# Patient Record
Sex: MALE | Race: WHITE | NOT HISPANIC OR LATINO | Employment: UNEMPLOYED | URBAN - NONMETROPOLITAN AREA
[De-identification: names, ages, dates, MRNs, and addresses within clinical notes are randomized per-mention and may not be internally consistent; named-entity substitution may affect disease eponyms.]

---

## 2017-11-21 ENCOUNTER — HOSPITAL ENCOUNTER (EMERGENCY)
Facility: HOSPITAL | Age: 42
Discharge: HOME | End: 2017-11-22
Attending: EMERGENCY MEDICINE | Admitting: EMERGENCY MEDICINE

## 2017-11-21 ENCOUNTER — APPOINTMENT (OUTPATIENT)
Dept: CT IMAGING | Facility: HOSPITAL | Age: 42
End: 2017-11-21

## 2017-11-21 DIAGNOSIS — R56.9 SEIZURE-LIKE ACTIVITY (HCC): Primary | ICD-10-CM

## 2017-11-21 LAB
ALBUMIN SERPL-MCNC: 4.7 G/DL (ref 3.5–5)
ALBUMIN/GLOB SERPL: 1.4 G/DL (ref 1.1–2.5)
ALP SERPL-CCNC: 81 U/L (ref 24–120)
ALT SERPL W P-5'-P-CCNC: 48 U/L (ref 0–54)
ANION GAP SERPL CALCULATED.3IONS-SCNC: 12 MMOL/L (ref 4–13)
APAP SERPL-MCNC: <10 MCG/ML (ref 10–30)
AST SERPL-CCNC: 88 U/L (ref 7–45)
BASOPHILS # BLD AUTO: 0.05 10*3/MM3 (ref 0–0.2)
BASOPHILS NFR BLD AUTO: 0.5 % (ref 0–2)
BILIRUB SERPL-MCNC: 1.2 MG/DL (ref 0.1–1)
BUN BLD-MCNC: 13 MG/DL (ref 5–21)
BUN/CREAT SERPL: 19.7 (ref 7–25)
CALCIUM SPEC-SCNC: 9.4 MG/DL (ref 8.4–10.4)
CHLORIDE SERPL-SCNC: 93 MMOL/L (ref 98–110)
CO2 SERPL-SCNC: 28 MMOL/L (ref 24–31)
CREAT BLD-MCNC: 0.66 MG/DL (ref 0.5–1.4)
D-LACTATE SERPL-SCNC: 1.4 MMOL/L (ref 0.5–2)
DEPRECATED RDW RBC AUTO: 50.6 FL (ref 40–54)
EOSINOPHIL # BLD AUTO: 0.02 10*3/MM3 (ref 0–0.7)
EOSINOPHIL NFR BLD AUTO: 0.2 % (ref 0–4)
ERYTHROCYTE [DISTWIDTH] IN BLOOD BY AUTOMATED COUNT: 14.4 % (ref 12–15)
ETHANOL UR QL: <0.01 %
GFR SERPL CREATININE-BSD FRML MDRD: 131 ML/MIN/1.73
GLOBULIN UR ELPH-MCNC: 3.4 GM/DL
GLUCOSE BLD-MCNC: 86 MG/DL (ref 70–100)
HCT VFR BLD AUTO: 35.2 % (ref 40–52)
HGB BLD-MCNC: 11.8 G/DL (ref 14–18)
IMM GRANULOCYTES # BLD: 0.02 10*3/MM3 (ref 0–0.03)
IMM GRANULOCYTES NFR BLD: 0.2 % (ref 0–5)
INR PPP: 0.95 (ref 0.91–1.09)
LIPASE SERPL-CCNC: 64 U/L (ref 23–203)
LYMPHOCYTES # BLD AUTO: 0.51 10*3/MM3 (ref 0.72–4.86)
LYMPHOCYTES NFR BLD AUTO: 4.8 % (ref 15–45)
MCH RBC QN AUTO: 32.1 PG (ref 28–32)
MCHC RBC AUTO-ENTMCNC: 33.5 G/DL (ref 33–36)
MCV RBC AUTO: 95.7 FL (ref 82–95)
MONOCYTES # BLD AUTO: 1.3 10*3/MM3 (ref 0.19–1.3)
MONOCYTES NFR BLD AUTO: 12.3 % (ref 4–12)
NEUTROPHILS # BLD AUTO: 8.65 10*3/MM3 (ref 1.87–8.4)
NEUTROPHILS NFR BLD AUTO: 82 % (ref 39–78)
PLATELET # BLD AUTO: 183 10*3/MM3 (ref 130–400)
PMV BLD AUTO: 10 FL (ref 6–12)
POTASSIUM BLD-SCNC: 3.9 MMOL/L (ref 3.5–5.3)
PROT SERPL-MCNC: 8.1 G/DL (ref 6.3–8.7)
PROTHROMBIN TIME: 13 SECONDS (ref 11.9–14.6)
RBC # BLD AUTO: 3.68 10*6/MM3 (ref 4.8–5.9)
SALICYLATES SERPL-MCNC: <1 MG/DL (ref 15–30)
SODIUM BLD-SCNC: 133 MMOL/L (ref 135–145)
WBC NRBC COR # BLD: 10.55 10*3/MM3 (ref 4.8–10.8)

## 2017-11-21 PROCEDURE — 85025 COMPLETE CBC W/AUTO DIFF WBC: CPT | Performed by: EMERGENCY MEDICINE

## 2017-11-21 PROCEDURE — 80307 DRUG TEST PRSMV CHEM ANLYZR: CPT | Performed by: EMERGENCY MEDICINE

## 2017-11-21 PROCEDURE — 25010000002 ONDANSETRON PER 1 MG: Performed by: EMERGENCY MEDICINE

## 2017-11-21 PROCEDURE — 85610 PROTHROMBIN TIME: CPT | Performed by: EMERGENCY MEDICINE

## 2017-11-21 PROCEDURE — 70450 CT HEAD/BRAIN W/O DYE: CPT

## 2017-11-21 PROCEDURE — 36415 COLL VENOUS BLD VENIPUNCTURE: CPT

## 2017-11-21 PROCEDURE — 99285 EMERGENCY DEPT VISIT HI MDM: CPT

## 2017-11-21 PROCEDURE — 80053 COMPREHEN METABOLIC PANEL: CPT | Performed by: EMERGENCY MEDICINE

## 2017-11-21 PROCEDURE — 83690 ASSAY OF LIPASE: CPT | Performed by: EMERGENCY MEDICINE

## 2017-11-21 PROCEDURE — 83605 ASSAY OF LACTIC ACID: CPT | Performed by: EMERGENCY MEDICINE

## 2017-11-21 PROCEDURE — 96374 THER/PROPH/DIAG INJ IV PUSH: CPT

## 2017-11-21 RX ORDER — ONDANSETRON 2 MG/ML
4 INJECTION INTRAMUSCULAR; INTRAVENOUS ONCE
Status: COMPLETED | OUTPATIENT
Start: 2017-11-21 | End: 2017-11-21

## 2017-11-21 RX ADMIN — ONDANSETRON 4 MG: 2 INJECTION, SOLUTION INTRAMUSCULAR; INTRAVENOUS at 23:20

## 2017-11-22 VITALS
SYSTOLIC BLOOD PRESSURE: 147 MMHG | WEIGHT: 140 LBS | TEMPERATURE: 98.1 F | HEIGHT: 63 IN | RESPIRATION RATE: 16 BRPM | DIASTOLIC BLOOD PRESSURE: 81 MMHG | BODY MASS INDEX: 24.8 KG/M2 | HEART RATE: 114 BPM | OXYGEN SATURATION: 98 %

## 2017-11-22 LAB
AMPHET+METHAMPHET UR QL: POSITIVE
BARBITURATES UR QL SCN: NEGATIVE
BENZODIAZ UR QL SCN: NEGATIVE
CANNABINOIDS SERPL QL: NEGATIVE
COCAINE UR QL: NEGATIVE
METHADONE UR QL SCN: NEGATIVE
OPIATES UR QL: NEGATIVE
PCP UR QL SCN: NEGATIVE

## 2017-11-22 PROCEDURE — 80307 DRUG TEST PRSMV CHEM ANLYZR: CPT | Performed by: EMERGENCY MEDICINE

## 2017-11-22 NOTE — ED PROVIDER NOTES
"Subjective   HPI Comments: Patient is a 42-year-old male who presents with seizure-like activity.  Per EMS, patient's father called secondary to seizure activity.  Patient does have seizure disorder and has not taken medication for at least 2 months.  Patient is a chronic alcoholic and drinks 1 L of vodka daily but has not drank for 2 days.  He does have a history of DTs.  Patient is unsure what medication he is on for seizures.  He reports vomiting today and restlessness.      History provided by:  EMS personnel and patient      Review of Systems   Constitutional: Negative for fever.   HENT: Negative for sore throat.    Eyes: Negative for visual disturbance.   Respiratory: Negative for shortness of breath.    Cardiovascular: Negative for chest pain.   Gastrointestinal: Positive for vomiting. Negative for abdominal distention, abdominal pain, constipation, diarrhea and nausea.   Genitourinary: Negative for difficulty urinating, dysuria, flank pain, frequency and hematuria.   Musculoskeletal: Negative for back pain.   Skin: Negative for rash.   Neurological: Positive for seizures. Negative for dizziness, tremors, syncope, facial asymmetry, speech difficulty, weakness, light-headedness, numbness and headaches.       Past Medical History:   Diagnosis Date   • Alcohol abuse    • Seizures        Allergies   Allergen Reactions   • Codeine GI Intolerance       History reviewed. No pertinent surgical history.    History reviewed. No pertinent family history.    Social History     Social History   • Marital status: Single     Spouse name: N/A   • Number of children: N/A   • Years of education: N/A     Social History Main Topics   • Smoking status: Never Smoker   • Smokeless tobacco: Current User     Types: Snuff   • Alcohol use Yes      Comment: \"LITER OF VODKA\" DAILY. PT STATES HE DRANK ONE SHOT OF WHISKEY TODAY.   • Drug use: No   • Sexual activity: Defer     Other Topics Concern   • None     Social History Narrative   • " None           Objective   Physical Exam   Constitutional: He is oriented to person, place, and time. He appears well-developed and well-nourished. He is cooperative.  Non-toxic appearance. No distress.   HENT:   Head: Atraumatic.   Mouth/Throat: Oropharynx is clear and moist and mucous membranes are normal.   Eyes: EOM are normal. Pupils are equal, round, and reactive to light.   Neck: Normal range of motion. Neck supple.   Cardiovascular: Normal rate, regular rhythm, normal heart sounds and intact distal pulses.  Exam reveals no gallop and no friction rub.    No murmur heard.  Pulmonary/Chest: Effort normal and breath sounds normal. No respiratory distress. He has no wheezes. He has no rhonchi. He has no rales. He exhibits no tenderness.   Abdominal: Soft. He exhibits no distension. There is no tenderness. There is no rebound and no guarding.   Musculoskeletal: Normal range of motion. He exhibits no edema.   Neurological: He is alert and oriented to person, place, and time. He displays normal reflexes. No cranial nerve deficit. He exhibits normal muscle tone. Coordination normal.   Skin: Skin is warm and dry.   Psychiatric: He has a normal mood and affect.   Nursing note and vitals reviewed.      Procedures         ED Course  ED Course   Comment By Time   This is a 42-year-old male who presents in the setting of seizure-like activity.  Upon arrival, patient had normal neurologic exam.  No signs of autonomic hyperactivity.  He was well-appearing.  No SI, HI, or hallucinations.  No signs of DTs.  Drug screen positive for amphetamines.  Labs otherwise benign with normal EtOH.  Lactate normal.  Head CT negative.  At this time, no indication for acute hospitalization.  We will discharge patient home.  Advised him to follow up with his PCP regarding his antiepileptic medication. Mando Cano MD 11/22 1061                  MDM  Number of Diagnoses or Management Options  Seizure-like activity:      Amount and/or  Complexity of Data Reviewed  Clinical lab tests: reviewed  Tests in the radiology section of CPT®: ordered and reviewed    Risk of Complications, Morbidity, and/or Mortality  Presenting problems: moderate  Diagnostic procedures: moderate  Management options: low    Patient Progress  Patient progress: stable      Final diagnoses:   Seizure-like activity            Mando Cano MD  11/22/17 0229

## 2017-11-22 NOTE — ED NOTES
PT STATES HE HAS HAD A SHOT OF WHISKEY TODAY. PT STATES HE HAS ALSO TAKEN LORTAB 7.5 MG X 1 AND SEROQUIL X 1 TODAY, BOTH MEDS NOT PRESCRIBED TO HIM.      Kassidy Ortiz RN  11/21/17 0566

## 2018-02-25 ENCOUNTER — APPOINTMENT (OUTPATIENT)
Dept: CT IMAGING | Facility: HOSPITAL | Age: 43
End: 2018-02-25

## 2018-02-25 ENCOUNTER — APPOINTMENT (OUTPATIENT)
Dept: GENERAL RADIOLOGY | Facility: HOSPITAL | Age: 43
End: 2018-02-25

## 2018-02-25 ENCOUNTER — HOSPITAL ENCOUNTER (EMERGENCY)
Facility: HOSPITAL | Age: 43
Discharge: HOME | End: 2018-02-26
Attending: EMERGENCY MEDICINE | Admitting: EMERGENCY MEDICINE

## 2018-02-25 DIAGNOSIS — M19.90 ARTHRITIS: ICD-10-CM

## 2018-02-25 DIAGNOSIS — F10.10 ALCOHOL ABUSE: ICD-10-CM

## 2018-02-25 DIAGNOSIS — F10.929 ALCOHOLIC INTOXICATION WITH COMPLICATION (HCC): ICD-10-CM

## 2018-02-25 DIAGNOSIS — R07.9 CHEST PAIN, UNSPECIFIED TYPE: Primary | ICD-10-CM

## 2018-02-25 LAB
ALBUMIN SERPL-MCNC: 4.4 G/DL (ref 3.5–5)
ALBUMIN/GLOB SERPL: 1.3 G/DL (ref 1.1–2.5)
ALP SERPL-CCNC: 102 U/L (ref 24–120)
ALT SERPL W P-5'-P-CCNC: 30 U/L (ref 0–54)
AMPHET+METHAMPHET UR QL: NEGATIVE
ANION GAP SERPL CALCULATED.3IONS-SCNC: 12 MMOL/L (ref 4–13)
APTT PPP: 26.7 SECONDS (ref 24.1–34.8)
AST SERPL-CCNC: 50 U/L (ref 7–45)
BACTERIA UR QL AUTO: ABNORMAL /HPF
BARBITURATES UR QL SCN: NEGATIVE
BASOPHILS # BLD AUTO: 0.05 10*3/MM3 (ref 0–0.2)
BASOPHILS NFR BLD AUTO: 0.9 % (ref 0–2)
BENZODIAZ UR QL SCN: POSITIVE
BILIRUB SERPL-MCNC: 0.2 MG/DL (ref 0.1–1)
BILIRUB UR QL STRIP: NEGATIVE
BUN BLD-MCNC: 7 MG/DL (ref 5–21)
BUN/CREAT SERPL: 10.1 (ref 7–25)
CALCIUM SPEC-SCNC: 8.7 MG/DL (ref 8.4–10.4)
CANNABINOIDS SERPL QL: NEGATIVE
CHLORIDE SERPL-SCNC: 96 MMOL/L (ref 98–110)
CK SERPL-CCNC: 264 U/L (ref 0–203)
CLARITY UR: ABNORMAL
CO2 SERPL-SCNC: 38 MMOL/L (ref 24–31)
COCAINE UR QL: NEGATIVE
COD CRY URNS QL: ABNORMAL /HPF
COLOR UR: YELLOW
CREAT BLD-MCNC: 0.69 MG/DL (ref 0.5–1.4)
D DIMER PPP FEU-MCNC: 2.04 MG/L (FEU) (ref 0–0.5)
DEPRECATED RDW RBC AUTO: 43.2 FL (ref 40–54)
EOSINOPHIL # BLD AUTO: 0.23 10*3/MM3 (ref 0–0.7)
EOSINOPHIL NFR BLD AUTO: 4.1 % (ref 0–4)
ERYTHROCYTE [DISTWIDTH] IN BLOOD BY AUTOMATED COUNT: 13.4 % (ref 12–15)
ETHANOL UR QL: 0.42 %
GFR SERPL CREATININE-BSD FRML MDRD: 125 ML/MIN/1.73
GLOBULIN UR ELPH-MCNC: 3.4 GM/DL
GLUCOSE BLD-MCNC: 82 MG/DL (ref 70–100)
GLUCOSE UR STRIP-MCNC: ABNORMAL MG/DL
HCT VFR BLD AUTO: 37.7 % (ref 40–52)
HGB BLD-MCNC: 12.5 G/DL (ref 14–18)
HGB UR QL STRIP.AUTO: NEGATIVE
HOLD SPECIMEN: NORMAL
HOLD SPECIMEN: NORMAL
HYALINE CASTS UR QL AUTO: ABNORMAL /LPF
IMM GRANULOCYTES # BLD: 0.01 10*3/MM3 (ref 0–0.03)
IMM GRANULOCYTES NFR BLD: 0.2 % (ref 0–5)
INR PPP: 0.92 (ref 0.91–1.09)
KETONES UR QL STRIP: ABNORMAL
LEUKOCYTE ESTERASE UR QL STRIP.AUTO: NEGATIVE
LIPASE SERPL-CCNC: 95 U/L (ref 23–203)
LYMPHOCYTES # BLD AUTO: 2.58 10*3/MM3 (ref 0.72–4.86)
LYMPHOCYTES NFR BLD AUTO: 46.4 % (ref 15–45)
MCH RBC QN AUTO: 29.3 PG (ref 28–32)
MCHC RBC AUTO-ENTMCNC: 33.2 G/DL (ref 33–36)
MCV RBC AUTO: 88.3 FL (ref 82–95)
METHADONE UR QL SCN: NEGATIVE
MONOCYTES # BLD AUTO: 0.77 10*3/MM3 (ref 0.19–1.3)
MONOCYTES NFR BLD AUTO: 13.8 % (ref 4–12)
NEUTROPHILS # BLD AUTO: 1.92 10*3/MM3 (ref 1.87–8.4)
NEUTROPHILS NFR BLD AUTO: 34.6 % (ref 39–78)
NITRITE UR QL STRIP: NEGATIVE
NRBC BLD MANUAL-RTO: 0 /100 WBC (ref 0–0)
OPIATES UR QL: NEGATIVE
PCP UR QL SCN: NEGATIVE
PH UR STRIP.AUTO: 7.5 [PH] (ref 5–8)
PLATELET # BLD AUTO: 248 10*3/MM3 (ref 130–400)
PMV BLD AUTO: 9.8 FL (ref 6–12)
POTASSIUM BLD-SCNC: 4.3 MMOL/L (ref 3.5–5.3)
PROT SERPL-MCNC: 7.8 G/DL (ref 6.3–8.7)
PROT UR QL STRIP: ABNORMAL
PROTHROMBIN TIME: 12.6 SECONDS (ref 11.9–14.6)
RBC # BLD AUTO: 4.27 10*6/MM3 (ref 4.8–5.9)
RBC # UR: ABNORMAL /HPF
REF LAB TEST METHOD: ABNORMAL
SODIUM BLD-SCNC: 146 MMOL/L (ref 135–145)
SP GR UR STRIP: 1.03 (ref 1–1.03)
SQUAMOUS #/AREA URNS HPF: ABNORMAL /HPF
TROPONIN I SERPL-MCNC: <0.012 NG/ML (ref 0–0.03)
UROBILINOGEN UR QL STRIP: ABNORMAL
WBC NRBC COR # BLD: 5.56 10*3/MM3 (ref 4.8–10.8)
WBC UR QL AUTO: ABNORMAL /HPF
WHOLE BLOOD HOLD SPECIMEN: NORMAL
WHOLE BLOOD HOLD SPECIMEN: NORMAL

## 2018-02-25 PROCEDURE — 80307 DRUG TEST PRSMV CHEM ANLYZR: CPT | Performed by: EMERGENCY MEDICINE

## 2018-02-25 PROCEDURE — 83690 ASSAY OF LIPASE: CPT | Performed by: EMERGENCY MEDICINE

## 2018-02-25 PROCEDURE — 0 IOPAMIDOL PER 1 ML: Performed by: EMERGENCY MEDICINE

## 2018-02-25 PROCEDURE — 96361 HYDRATE IV INFUSION ADD-ON: CPT

## 2018-02-25 PROCEDURE — 93005 ELECTROCARDIOGRAM TRACING: CPT | Performed by: EMERGENCY MEDICINE

## 2018-02-25 PROCEDURE — 73030 X-RAY EXAM OF SHOULDER: CPT

## 2018-02-25 PROCEDURE — 82550 ASSAY OF CK (CPK): CPT | Performed by: EMERGENCY MEDICINE

## 2018-02-25 PROCEDURE — 85379 FIBRIN DEGRADATION QUANT: CPT | Performed by: EMERGENCY MEDICINE

## 2018-02-25 PROCEDURE — 99285 EMERGENCY DEPT VISIT HI MDM: CPT

## 2018-02-25 PROCEDURE — 80053 COMPREHEN METABOLIC PANEL: CPT | Performed by: EMERGENCY MEDICINE

## 2018-02-25 PROCEDURE — 84484 ASSAY OF TROPONIN QUANT: CPT | Performed by: EMERGENCY MEDICINE

## 2018-02-25 PROCEDURE — 85610 PROTHROMBIN TIME: CPT | Performed by: EMERGENCY MEDICINE

## 2018-02-25 PROCEDURE — 85025 COMPLETE CBC W/AUTO DIFF WBC: CPT | Performed by: EMERGENCY MEDICINE

## 2018-02-25 PROCEDURE — 93010 ELECTROCARDIOGRAM REPORT: CPT | Performed by: INTERNAL MEDICINE

## 2018-02-25 PROCEDURE — 71045 X-RAY EXAM CHEST 1 VIEW: CPT

## 2018-02-25 PROCEDURE — 81001 URINALYSIS AUTO W/SCOPE: CPT | Performed by: EMERGENCY MEDICINE

## 2018-02-25 PROCEDURE — 85730 THROMBOPLASTIN TIME PARTIAL: CPT | Performed by: EMERGENCY MEDICINE

## 2018-02-25 PROCEDURE — 71275 CT ANGIOGRAPHY CHEST: CPT

## 2018-02-25 PROCEDURE — 96374 THER/PROPH/DIAG INJ IV PUSH: CPT

## 2018-02-25 RX ORDER — RANITIDINE HCL 75 MG
75 TABLET ORAL 2 TIMES DAILY
COMMUNITY
End: 2018-02-26

## 2018-02-25 RX ORDER — SODIUM CHLORIDE 9 MG/ML
1000 INJECTION, SOLUTION INTRAVENOUS ONCE
Status: COMPLETED | OUTPATIENT
Start: 2018-02-25 | End: 2018-02-25

## 2018-02-25 RX ORDER — SUCRALFATE ORAL 1 G/10ML
1 SUSPENSION ORAL ONCE
Status: COMPLETED | OUTPATIENT
Start: 2018-02-25 | End: 2018-02-25

## 2018-02-25 RX ORDER — DIAZEPAM 10 MG/1
10 TABLET ORAL ONCE
Status: COMPLETED | OUTPATIENT
Start: 2018-02-25 | End: 2018-02-25

## 2018-02-25 RX ORDER — LISINOPRIL 10 MG/1
10 TABLET ORAL DAILY
COMMUNITY
End: 2018-02-26

## 2018-02-25 RX ORDER — SUCRALFATE 1 G/1
1 TABLET ORAL 4 TIMES DAILY
COMMUNITY
End: 2018-02-26

## 2018-02-25 RX ORDER — FAMOTIDINE 10 MG/ML
20 INJECTION, SOLUTION INTRAVENOUS ONCE
Status: COMPLETED | OUTPATIENT
Start: 2018-02-25 | End: 2018-02-25

## 2018-02-25 RX ADMIN — DIAZEPAM 10 MG: 10 TABLET ORAL at 20:56

## 2018-02-25 RX ADMIN — SODIUM CHLORIDE 1000 ML: 9 INJECTION, SOLUTION INTRAVENOUS at 20:54

## 2018-02-25 RX ADMIN — FAMOTIDINE 20 MG: 10 INJECTION INTRAVENOUS at 20:55

## 2018-02-25 RX ADMIN — IOPAMIDOL 105 ML: 755 INJECTION, SOLUTION INTRAVENOUS at 23:25

## 2018-02-25 RX ADMIN — SUCRALFATE 1 G: 1 SUSPENSION ORAL at 23:01

## 2018-02-26 VITALS
TEMPERATURE: 98.2 F | WEIGHT: 125 LBS | SYSTOLIC BLOOD PRESSURE: 125 MMHG | BODY MASS INDEX: 22.15 KG/M2 | HEART RATE: 78 BPM | RESPIRATION RATE: 18 BRPM | OXYGEN SATURATION: 94 % | HEIGHT: 63 IN | DIASTOLIC BLOOD PRESSURE: 90 MMHG

## 2018-02-26 LAB — TROPONIN I SERPL-MCNC: <0.012 NG/ML (ref 0–0.03)

## 2018-02-26 PROCEDURE — 84484 ASSAY OF TROPONIN QUANT: CPT | Performed by: EMERGENCY MEDICINE

## 2018-02-26 PROCEDURE — 93005 ELECTROCARDIOGRAM TRACING: CPT | Performed by: EMERGENCY MEDICINE

## 2018-02-26 PROCEDURE — 93010 ELECTROCARDIOGRAM REPORT: CPT | Performed by: INTERNAL MEDICINE

## 2018-02-26 RX ORDER — LISINOPRIL 10 MG/1
10 TABLET ORAL DAILY
Qty: 30 TABLET | Refills: 0 | Status: SHIPPED | OUTPATIENT
Start: 2018-02-26

## 2018-02-26 RX ORDER — SUCRALFATE 1 G/1
1 TABLET ORAL 4 TIMES DAILY
Qty: 30 TABLET | Refills: 0 | OUTPATIENT
Start: 2018-02-26 | End: 2020-06-09 | Stop reason: HOSPADM

## 2018-02-26 RX ORDER — RANITIDINE HCL 75 MG
75 TABLET ORAL NIGHTLY
Qty: 30 TABLET | Refills: 0 | OUTPATIENT
Start: 2018-02-26 | End: 2020-06-09 | Stop reason: HOSPADM

## 2018-10-09 ENCOUNTER — APPOINTMENT (OUTPATIENT)
Dept: GENERAL RADIOLOGY | Age: 43
End: 2018-10-09
Payer: COMMERCIAL

## 2018-10-09 ENCOUNTER — APPOINTMENT (OUTPATIENT)
Dept: CT IMAGING | Age: 43
End: 2018-10-09
Payer: COMMERCIAL

## 2018-10-09 ENCOUNTER — HOSPITAL ENCOUNTER (EMERGENCY)
Age: 43
Discharge: ANOTHER HOSPITAL | End: 2018-10-09
Attending: EMERGENCY MEDICINE
Payer: COMMERCIAL

## 2018-10-09 VITALS
WEIGHT: 110 LBS | RESPIRATION RATE: 18 BRPM | TEMPERATURE: 98.1 F | BODY MASS INDEX: 19.49 KG/M2 | SYSTOLIC BLOOD PRESSURE: 135 MMHG | HEIGHT: 63 IN | OXYGEN SATURATION: 95 % | DIASTOLIC BLOOD PRESSURE: 79 MMHG | HEART RATE: 98 BPM

## 2018-10-09 DIAGNOSIS — S72.92XA CLOSED FRACTURE OF LEFT FEMUR, UNSPECIFIED FRACTURE MORPHOLOGY, UNSPECIFIED PORTION OF FEMUR, INITIAL ENCOUNTER (HCC): Primary | ICD-10-CM

## 2018-10-09 DIAGNOSIS — F19.10 SUBSTANCE ABUSE (HCC): ICD-10-CM

## 2018-10-09 DIAGNOSIS — S92.002A CLOSED DISPLACED FRACTURE OF LEFT CALCANEUS, UNSPECIFIED PORTION OF CALCANEUS, INITIAL ENCOUNTER: ICD-10-CM

## 2018-10-09 DIAGNOSIS — F10.920 ACUTE ALCOHOLIC INTOXICATION WITHOUT COMPLICATION (HCC): ICD-10-CM
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
## 2018-10-09 DIAGNOSIS — S22.000A CLOSED WEDGE FRACTURE OF THORACIC VERTEBRA, UNSPECIFIED THORACIC VERTEBRAL LEVEL, INITIAL ENCOUNTER (HCC): ICD-10-CM

## 2018-10-09 LAB
ALBUMIN SERPL-MCNC: 4.6 G/DL (ref 3.5–5.2)
ALP BLD-CCNC: 85 U/L (ref 40–130)
ALT SERPL-CCNC: 19 U/L (ref 5–41)
AMPHETAMINE SCREEN, URINE: POSITIVE
ANION GAP SERPL CALCULATED.3IONS-SCNC: 13 MMOL/L (ref 7–19)
APTT: 24.4 SEC (ref 26–36.2)
AST SERPL-CCNC: 26 U/L (ref 5–40)
BARBITURATE SCREEN URINE: NEGATIVE
BASOPHILS ABSOLUTE: 0 K/UL (ref 0–0.2)
BASOPHILS RELATIVE PERCENT: 0.4 % (ref 0–1)
BENZODIAZEPINE SCREEN, URINE: POSITIVE
BILIRUB SERPL-MCNC: <0.2 MG/DL (ref 0.2–1.2)
BILIRUBIN URINE: NEGATIVE
BLOOD, URINE: NEGATIVE
BUN BLDV-MCNC: 9 MG/DL (ref 6–20)
CALCIUM SERPL-MCNC: 9.5 MG/DL (ref 8.6–10)
CANNABINOID SCREEN URINE: NEGATIVE
CHLORIDE BLD-SCNC: 99 MMOL/L (ref 98–111)
CLARITY: CLEAR
CO2: 27 MMOL/L (ref 22–29)
COCAINE METABOLITE SCREEN URINE: NEGATIVE
COLOR: YELLOW
CREAT SERPL-MCNC: 0.6 MG/DL (ref 0.5–1.2)
EOSINOPHILS ABSOLUTE: 0.2 K/UL (ref 0–0.6)
EOSINOPHILS RELATIVE PERCENT: 4.3 % (ref 0–5)
ETHANOL: 215 MG/DL (ref 0–0.08)
GFR NON-AFRICAN AMERICAN: >60
GLUCOSE BLD-MCNC: 112 MG/DL (ref 74–109)
GLUCOSE URINE: NEGATIVE MG/DL
HCT VFR BLD CALC: 39.7 % (ref 42–52)
HEMOGLOBIN: 12.9 G/DL (ref 14–18)
INR BLD: 1.05 (ref 0.88–1.18)
KETONES, URINE: NEGATIVE MG/DL
LEUKOCYTE ESTERASE, URINE: NEGATIVE
LYMPHOCYTES ABSOLUTE: 1.7 K/UL (ref 1.1–4.5)
LYMPHOCYTES RELATIVE PERCENT: 33.9 % (ref 20–40)
Lab: ABNORMAL
MCH RBC QN AUTO: 29.4 PG (ref 27–31)
MCHC RBC AUTO-ENTMCNC: 32.5 G/DL (ref 33–37)
MCV RBC AUTO: 90.4 FL (ref 80–94)
MONOCYTES ABSOLUTE: 0.6 K/UL (ref 0–0.9)
MONOCYTES RELATIVE PERCENT: 11 % (ref 0–10)
NEUTROPHILS ABSOLUTE: 2.6 K/UL (ref 1.5–7.5)
NEUTROPHILS RELATIVE PERCENT: 50 % (ref 50–65)
NITRITE, URINE: NEGATIVE
OPIATE SCREEN URINE: NEGATIVE
PDW BLD-RTO: 14.1 % (ref 11.5–14.5)
PH UA: 7
PLATELET # BLD: 194 K/UL (ref 130–400)
PMV BLD AUTO: 10.2 FL (ref 9.4–12.4)
POTASSIUM SERPL-SCNC: 3.8 MMOL/L (ref 3.5–5)
PROTEIN UA: NEGATIVE MG/DL
PROTHROMBIN TIME: 13.6 SEC (ref 12–14.6)
RBC # BLD: 4.39 M/UL (ref 4.7–6.1)
SODIUM BLD-SCNC: 139 MMOL/L (ref 136–145)
SPECIFIC GRAVITY UA: 1.01
TOTAL PROTEIN: 7.7 G/DL (ref 6.6–8.7)
URINE REFLEX TO CULTURE: NORMAL
UROBILINOGEN, URINE: 0.2 E.U./DL
WBC # BLD: 5.1 K/UL (ref 4.8–10.8)

## 2018-10-09 PROCEDURE — 73610 X-RAY EXAM OF ANKLE: CPT

## 2018-10-09 PROCEDURE — 2500000003 HC RX 250 WO HCPCS: Performed by: NURSE PRACTITIONER

## 2018-10-09 PROCEDURE — 36415 COLL VENOUS BLD VENIPUNCTURE: CPT

## 2018-10-09 PROCEDURE — 72128 CT CHEST SPINE W/O DYE: CPT

## 2018-10-09 PROCEDURE — 6360000002 HC RX W HCPCS: Performed by: EMERGENCY MEDICINE

## 2018-10-09 PROCEDURE — 71045 X-RAY EXAM CHEST 1 VIEW: CPT

## 2018-10-09 PROCEDURE — G0480 DRUG TEST DEF 1-7 CLASSES: HCPCS

## 2018-10-09 PROCEDURE — 96365 THER/PROPH/DIAG IV INF INIT: CPT

## 2018-10-09 PROCEDURE — 73552 X-RAY EXAM OF FEMUR 2/>: CPT

## 2018-10-09 PROCEDURE — 73030 X-RAY EXAM OF SHOULDER: CPT

## 2018-10-09 PROCEDURE — 85610 PROTHROMBIN TIME: CPT

## 2018-10-09 PROCEDURE — 6360000002 HC RX W HCPCS: Performed by: NURSE PRACTITIONER

## 2018-10-09 PROCEDURE — 70450 CT HEAD/BRAIN W/O DYE: CPT

## 2018-10-09 PROCEDURE — 96375 TX/PRO/DX INJ NEW DRUG ADDON: CPT

## 2018-10-09 PROCEDURE — 99285 EMERGENCY DEPT VISIT HI MDM: CPT | Performed by: EMERGENCY MEDICINE

## 2018-10-09 PROCEDURE — 74177 CT ABD & PELVIS W/CONTRAST: CPT

## 2018-10-09 PROCEDURE — 85730 THROMBOPLASTIN TIME PARTIAL: CPT

## 2018-10-09 PROCEDURE — 80307 DRUG TEST PRSMV CHEM ANLYZR: CPT

## 2018-10-09 PROCEDURE — 73502 X-RAY EXAM HIP UNI 2-3 VIEWS: CPT

## 2018-10-09 PROCEDURE — 2580000003 HC RX 258: Performed by: NURSE PRACTITIONER

## 2018-10-09 PROCEDURE — 93005 ELECTROCARDIOGRAM TRACING: CPT

## 2018-10-09 PROCEDURE — 85025 COMPLETE CBC W/AUTO DIFF WBC: CPT

## 2018-10-09 PROCEDURE — 73630 X-RAY EXAM OF FOOT: CPT

## 2018-10-09 PROCEDURE — 72072 X-RAY EXAM THORAC SPINE 3VWS: CPT

## 2018-10-09 PROCEDURE — 99285 EMERGENCY DEPT VISIT HI MDM: CPT

## 2018-10-09 PROCEDURE — 73560 X-RAY EXAM OF KNEE 1 OR 2: CPT

## 2018-10-09 PROCEDURE — 81003 URINALYSIS AUTO W/O SCOPE: CPT

## 2018-10-09 PROCEDURE — 80053 COMPREHEN METABOLIC PANEL: CPT

## 2018-10-09 PROCEDURE — 72100 X-RAY EXAM L-S SPINE 2/3 VWS: CPT

## 2018-10-09 PROCEDURE — 71260 CT THORAX DX C+: CPT

## 2018-10-09 PROCEDURE — 6360000004 HC RX CONTRAST MEDICATION: Performed by: EMERGENCY MEDICINE

## 2018-10-09 PROCEDURE — 72125 CT NECK SPINE W/O DYE: CPT

## 2018-10-09 PROCEDURE — 72131 CT LUMBAR SPINE W/O DYE: CPT

## 2018-10-09 RX ORDER — MORPHINE SULFATE/0.9% NACL/PF 1 MG/ML
4 SYRINGE (ML) INJECTION ONCE
Status: COMPLETED | OUTPATIENT
Start: 2018-10-09 | End: 2018-10-09

## 2018-10-09 RX ORDER — ONDANSETRON 2 MG/ML
4 INJECTION INTRAMUSCULAR; INTRAVENOUS ONCE
Status: COMPLETED | OUTPATIENT
Start: 2018-10-09 | End: 2018-10-09

## 2018-10-09 RX ADMIN — FOLIC ACID: 5 INJECTION, SOLUTION INTRAMUSCULAR; INTRAVENOUS; SUBCUTANEOUS at 13:00

## 2018-10-09 RX ADMIN — IOPAMIDOL 90 ML: 755 INJECTION, SOLUTION INTRAVENOUS at 11:41

## 2018-10-09 RX ADMIN — ONDANSETRON 4 MG: 2 INJECTION INTRAMUSCULAR; INTRAVENOUS at 11:35

## 2018-10-09 RX ADMIN — Medication 4 MG: at 11:34

## 2018-10-09 ASSESSMENT — PAIN SCALES - GENERAL: PAINLEVEL_OUTOF10: 9

## 2018-10-09 ASSESSMENT — PAIN DESCRIPTION - ORIENTATION: ORIENTATION: LEFT

## 2018-10-09 ASSESSMENT — PAIN DESCRIPTION - LOCATION: LOCATION: LEG;ANKLE;HIP

## 2018-10-09 NOTE — ED PROVIDER NOTES
Cervical spondylosis. No significant spinal stenosis. The above finding are recorded on a digital voice clip in PACS. Signed by Dr Mike Padilla on 10/9/2018 10:07 AM      XR CHEST PORTABLE   Final Result   Portable chest x-ray demonstrates no active disease. Signed by Dr Theodis Brittle on 10/9/2018 10:17 AM      XR HIP 2-3 VW W PELVIS LEFT   Final Result   Proximal left femur fracture, as described. Signed by Dr Theodis Brittle on 10/9/2018 10:17 AM      XR FEMUR LEFT (MIN 2 VIEWS)   Final Result   Proximal left femur fracture, as described above. Signed by Dr Theodis Brittle on 10/9/2018 10:17 AM      XR KNEE LEFT (1-2 VIEWS)   Final Result   No acute findings. Signed by Dr Theodis Brittle on 10/9/2018 10:14 AM      XR ANKLE LEFT (MIN 3 VIEWS)   Final Result   1. No ankle fracture is seen. 2. Calcaneal fracture. Signed by Dr Theodis Brittle on 10/9/2018 10:12 AM      XR SHOULDER LEFT (MIN 2 VIEWS)   Final Result   No acute findings. Signed by Dr Theodis Brittle on 10/9/2018 10:10 AM      CT Head WO Contrast   Final Result   1. No acute intracranial abnormality.     Signed by Dr Jennyfer Hopkins on 10/9/2018 9:40 AM            ED BEDSIDE ULTRASOUND:   Performed by ED Physician - none    LABS:  Labs Reviewed   CBC WITH AUTO DIFFERENTIAL - Abnormal; Notable for the following:        Result Value    RBC 4.39 (*)     Hemoglobin 12.9 (*)     Hematocrit 39.7 (*)     MCHC 32.5 (*)     Monocytes % 11.0 (*)     All other components within normal limits   COMPREHENSIVE METABOLIC PANEL - Abnormal; Notable for the following:     Glucose 112 (*)     All other components within normal limits   APTT - Abnormal; Notable for the following:     aPTT 24.4 (*)     All other components within normal limits   URINE DRUG SCREEN - Abnormal; Notable for the following:     Amphetamine Screen, Urine Positive (*)     Benzodiazepine Screen, Urine Positive (*)     All other components within normal limits   PROTIME-INR   URINE RT REFLEX TO CULTURE   ETHANOL       All other labs were within normal range or not returned as of this dictation. EMERGENCY DEPARTMENT COURSE and DIFFERENTIAL DIAGNOSIS/MDM:   Vitals:    Vitals:    10/09/18 0849 10/09/18 0900 10/09/18 1100   BP: (!) 145/86 (!) 141/77 133/80   Pulse: 88 62 86   Resp: 18 18 18   Temp: 98.3 °F (36.8 °C)     SpO2: 93% 95% 94%   Weight: 110 lb (49.9 kg)     Height: 5' 3\" (1.6 m)         MDM      CONSULTS:  None    PROCEDURES:  Unless otherwise noted below, none     Procedures    FINAL IMPRESSION      1. Closed fracture of left femur, unspecified fracture morphology, unspecified portion of femur, initial encounter ([facility]    2. Closed displaced fracture of left calcaneus, unspecified portion of calcaneus, initial encounter    3. Closed wedge fracture of thoracic vertebra, unspecified thoracic vertebral level, initial encounter ([facility]    4. Fall, initial encounter    5. Acute alcoholic intoxication without complication (HCC)    6. Substance abuse ([facility]          DISPOSITION/PLAN   DISPOSITION     PATIENT REFERRED TO:  No follow-up provider specified.     DISCHARGE MEDICATIONS:  New Prescriptions    No medications on file          (Please note that portions of this note were completed with a voice recognition program.  Efforts were made to edit the dictations but occasionally words are mis-transcribed.)    Fadumo Ross MD (electronically signed)  Attending Emergency Physician            Fadumo Ross MD  10/09/18 7646

## 2018-10-09 NOTE — ED NOTES
1250: [facility] called regarding possible transfer to [place]/Trauma.      Rubina Fillers  10/09/18 3207

## 2018-10-09 NOTE — ED PROVIDER NOTES
[facility] EMERGENCY DEPT  eMERGENCY dEPARTMENT eNCOUnter      Pt Name: Afia Justin  MRN: [id number]  [place] 1975  Date of evaluation: 10/9/2018  Provider: [place], [address]       Chief Complaint   Patient presents with    Hip Pain    Fall         HISTORY OF PRESENT ILLNESS   (Location/Symptom, Timing/Onset, Context/Setting, Quality, Duration, Modifying Factors, Severity)  Note limiting factors. Afia Justin is a 38 y.o. male who presents to the emergency department for evaluation of fall. Pt tells me that he fell 2.5 stories from roof top. He tells me that his foot \"slipped\" trying to jump to another roof. He tells me that he has left shoulder and left hip pain unable to walk. He tells me that he drinks etoh daily. He denies chest pain as well as abdominal pain. [facility]    Nursing Notes were reviewed. REVIEW OF SYSTEMS    (2-9 systems for level 4, 10 or more for level 5)     Review of Systems   Unable to perform ROS: Acuity of condition       A complete review of systems was performed and is negative except as noted above in the HPI. PAST MEDICAL HISTORY     Past Medical History:   Diagnosis Date    Cirrhosis ([facility]     Diabetes mellitus ([facility]     ETOH abuse     Hypertension     Pancreatitis     Stomach cancer ([facility]          SURGICAL HISTORY     History reviewed. No pertinent surgical history. CURRENT MEDICATIONS       Previous Medications    No medications on file       ALLERGIES     Codeine    FAMILY HISTORY     History reviewed. No pertinent family history.        SOCIAL HISTORY       Social History     Social History    Marital status: Single     Spouse name: N/A    Number of children: N/A    Years of education: N/A     Social History Main Topics    Smoking status: Current Every Day Smoker     Types: Cigarettes    Smokeless tobacco: Never Used    Alcohol use Yes    Drug use: Unknown    Sexual activity: Not Asked     Other Topics Concern    None     Social History findings:        Interpretation per the Radiologist below, if available at the time of this note:    CT Thoracic Spine WO Contrast   Final Result   1. Chronic appearing compression fractures involving T4 and T6. No   acute fracture is seen. 2. Degenerative disc disease at multiple levels. The full report of this exam was immediately signed and available to   the emergency room. The patient is currently in the emergency room. Signed by Dr Lore Benavides on 10/9/2018 12:07 PM      CT ABDOMEN PELVIS W IV CONTRAST Additional Contrast? None   Final Result   1. Incompletely visualized fracture of the proximal left femur. 2. No acute abnormality or significant traumatic injury is seen within   the abdomen or pelvis. Signed by Dr Chelsi Cruz on 10/9/2018 12:05 PM      XR FOOT LEFT (MIN 3 VIEWS)   Final Result   Comminuted fracture involving the calcaneus with no   significant displacement identified. Signed by Dr Lore Benavides on 10/9/2018 12:05 PM      CT Chest W Contrast   Final Result   1. No evidence of lung contusion, pneumothorax or mediastinal   hemorrhage. 2. Right subclavian vein stenosis with multiple collateral veins in   the right chest wall. The full report of this exam was immediately signed and available to   the emergency room. The patient is currently in the emergency room. Signed by Dr Lore Benavides on 10/9/2018 12:03 PM      CT Lumbar Spine WO Contrast   Final Result   1. No acute fracture. Signed by Dr Chelsi Cruz on 10/9/2018 12:02 PM      XR THORACIC SPINE (3 VIEWS)   Final Result   1. Minimal wedge deformity of T4 and T6, age indeterminate. No   paraspinal hematoma is seen. 2. Degenerative changes, as described. Signed by Dr Lore Benavides on 10/9/2018 10:21 AM      XR LUMBAR SPINE (2-3 VIEWS)   Final Result   1. Mild scoliosis and mild endplate spurring. 2. No evidence of fracture.    Signed by Dr Chelsi Cruz on 10/9/2018 10:18 AM      CT Cervical

## 2018-10-10 LAB
EKG P AXIS: 75 DEGREES
EKG P-R INTERVAL: 190 MS
EKG Q-T INTERVAL: 438 MS
EKG QRS DURATION: 88 MS
EKG QTC CALCULATION (BAZETT): 435 MS
EKG T AXIS: 68 DEGREES

## 2018-10-26 ENCOUNTER — HOSPITAL ENCOUNTER (EMERGENCY)
Age: 43
Discharge: HOME | End: 2018-10-26
Attending: EMERGENCY MEDICINE
Payer: COMMERCIAL

## 2018-10-26 VITALS
SYSTOLIC BLOOD PRESSURE: 110 MMHG | HEART RATE: 97 BPM | TEMPERATURE: 97.9 F | OXYGEN SATURATION: 96 % | DIASTOLIC BLOOD PRESSURE: 83 MMHG | RESPIRATION RATE: 15 BRPM

## 2018-10-26 DIAGNOSIS — F10.920 ACUTE ALCOHOLIC INTOXICATION WITHOUT COMPLICATION (HCC): ICD-10-CM

## 2018-10-26 DIAGNOSIS — Z48.02: Primary | ICD-10-CM

## 2018-10-26 PROCEDURE — 99283 EMERGENCY DEPT VISIT LOW MDM: CPT

## 2018-10-26 PROCEDURE — 99283 EMERGENCY DEPT VISIT LOW MDM: CPT | Performed by: EMERGENCY MEDICINE

## 2018-10-26 ASSESSMENT — PAIN DESCRIPTION - ORIENTATION: ORIENTATION: LEFT

## 2018-10-26 ASSESSMENT — PAIN SCALES - GENERAL: PAINLEVEL_OUTOF10: 9

## 2018-10-26 ASSESSMENT — PAIN DESCRIPTION - LOCATION: LOCATION: HIP

## 2018-10-27 NOTE — ED NOTES
Patient had a 1/4 filled bottle of Vodka on his person, he gave it to security who then pored it out     Mark Barrios, [facility]  10/26/18 0272

## 2018-10-27 NOTE — ED PROVIDER NOTES
SCREENINGS             PHYSICAL EXAM    (up to 7 for level 4, 8 or more for level 5)     ED Triage Vitals [10/26/18 2038]   BP Temp Temp Source Pulse Resp SpO2 Height Weight   111/86 97.9 °F (36.6 °C) Oral 97 15 95 % -- --       Physical Exam   Constitutional: He is oriented to person, place, and time. He appears well-developed and well-nourished. No distress. Intoxicated   Neurological: He is alert and oriented to person, place, and time. No cranial nerve deficit. Skin: Skin is warm and dry. Capillary refill takes less than 2 seconds. Psychiatric: His affect is blunt. His speech is slurred. He is slowed. Cognition and memory are impaired. Nursing note and vitals reviewed. DIAGNOSTIC RESULTS     EKG: All EKG's areinterpreted by the Emergency Department Physician who either signs or Co-signs this chart in the absence of a cardiologist.        RADIOLOGY:  Non-plain film images such as CT, Ultrasound and MRI are read by the radiologist. Plain radiographic images are visualized and preliminarily interpreted bythe emergency physician with the below findings:          No orders to display           LABS:  Labs Reviewed - No data to display    All other labs were within normal range or not returned as of this dictation. EMERGENCY DEPARTMENT COURSE and DIFFERENTIAL DIAGNOSIS/MDM:   Vitals:    Vitals:    10/26/18 2038 10/26/18 2101 10/26/18 2132   BP: 111/86 133/87 101/75   Pulse: 97     Resp: 15     Temp: 97.9 °F (36.6 °C)     TempSrc: Oral     SpO2: 95% 92% 94%       MDM  Number of Diagnoses or Management Options  Diagnosis management comments: Patient's staples were easily removed. He is intoxicated but there is no reason to keep him if he can find a ride. He is not suicidal or homicidal. He tells me that drinking all day. He was ready to get the sutures taken out.     Patient Progress  Patient progress: stable      Reassessment    CONSULTS:  None    PROCEDURES:  Unless otherwise noted below,

## 2018-11-19 ENCOUNTER — HOSPITAL ENCOUNTER (EMERGENCY)
Age: 43
Discharge: HOME | End: 2018-11-19
Attending: EMERGENCY MEDICINE
Payer: MEDICAID

## 2018-11-19 ENCOUNTER — APPOINTMENT (OUTPATIENT)
Dept: CT IMAGING | Age: 43
End: 2018-11-19
Payer: MEDICAID

## 2018-11-19 VITALS
DIASTOLIC BLOOD PRESSURE: 89 MMHG | OXYGEN SATURATION: 97 % | BODY MASS INDEX: 19.49 KG/M2 | HEART RATE: 100 BPM | HEIGHT: 63 IN | SYSTOLIC BLOOD PRESSURE: 137 MMHG | RESPIRATION RATE: 16 BRPM | TEMPERATURE: 98.9 F | WEIGHT: 110 LBS

## 2018-11-19 DIAGNOSIS — G89.29 OTHER CHRONIC PAIN: ICD-10-CM

## 2018-11-19 DIAGNOSIS — F10.20 ALCOHOLISM (HCC): Primary | ICD-10-CM

## 2018-11-19 LAB
ALBUMIN SERPL-MCNC: 4.3 G/DL (ref 3.5–5.2)
ALP BLD-CCNC: 135 U/L (ref 40–130)
ALT SERPL-CCNC: 10 U/L (ref 5–41)
ANION GAP SERPL CALCULATED.3IONS-SCNC: 16 MMOL/L (ref 7–19)
AST SERPL-CCNC: 24 U/L (ref 5–40)
BASOPHILS ABSOLUTE: 0.1 K/UL (ref 0–0.2)
BASOPHILS RELATIVE PERCENT: 1 % (ref 0–1)
BILIRUB SERPL-MCNC: 0.6 MG/DL (ref 0.2–1.2)
BUN BLDV-MCNC: 4 MG/DL (ref 6–20)
CALCIUM SERPL-MCNC: 9.6 MG/DL (ref 8.6–10)
CHLORIDE BLD-SCNC: 94 MMOL/L (ref 98–111)
CO2: 24 MMOL/L (ref 22–29)
CREAT SERPL-MCNC: 0.7 MG/DL (ref 0.5–1.2)
EOSINOPHILS ABSOLUTE: 0.1 K/UL (ref 0–0.6)
EOSINOPHILS RELATIVE PERCENT: 1.7 % (ref 0–5)
ETHANOL: <10 MG/DL (ref 0–0.08)
GFR NON-AFRICAN AMERICAN: >60
GLUCOSE BLD-MCNC: 108 MG/DL (ref 74–109)
HCT VFR BLD CALC: 41.1 % (ref 42–52)
HEMOGLOBIN: 13.2 G/DL (ref 14–18)
LYMPHOCYTES ABSOLUTE: 1 K/UL (ref 1.1–4.5)
LYMPHOCYTES RELATIVE PERCENT: 16.3 % (ref 20–40)
MAGNESIUM: 1.5 MG/DL (ref 1.6–2.6)
MCH RBC QN AUTO: 30.1 PG (ref 27–31)
MCHC RBC AUTO-ENTMCNC: 32.1 G/DL (ref 33–37)
MCV RBC AUTO: 93.8 FL (ref 80–94)
MONOCYTES ABSOLUTE: 0.8 K/UL (ref 0–0.9)
MONOCYTES RELATIVE PERCENT: 12.5 % (ref 0–10)
NEUTROPHILS ABSOLUTE: 4.1 K/UL (ref 1.5–7.5)
NEUTROPHILS RELATIVE PERCENT: 68.3 % (ref 50–65)
PDW BLD-RTO: 15.2 % (ref 11.5–14.5)
PLATELET # BLD: 329 K/UL (ref 130–400)
PMV BLD AUTO: 9.6 FL (ref 9.4–12.4)
POTASSIUM SERPL-SCNC: 3.8 MMOL/L (ref 3.5–5)
RBC # BLD: 4.38 M/UL (ref 4.7–6.1)
SODIUM BLD-SCNC: 134 MMOL/L (ref 136–145)
TOTAL PROTEIN: 7.8 G/DL (ref 6.6–8.7)
WBC # BLD: 6 K/UL (ref 4.8–10.8)

## 2018-11-19 PROCEDURE — 70450 CT HEAD/BRAIN W/O DYE: CPT

## 2018-11-19 PROCEDURE — 99285 EMERGENCY DEPT VISIT HI MDM: CPT

## 2018-11-19 PROCEDURE — 6360000002 HC RX W HCPCS: Performed by: EMERGENCY MEDICINE

## 2018-11-19 PROCEDURE — 99284 EMERGENCY DEPT VISIT MOD MDM: CPT | Performed by: EMERGENCY MEDICINE

## 2018-11-19 PROCEDURE — 36415 COLL VENOUS BLD VENIPUNCTURE: CPT

## 2018-11-19 PROCEDURE — 80053 COMPREHEN METABOLIC PANEL: CPT

## 2018-11-19 PROCEDURE — 83735 ASSAY OF MAGNESIUM: CPT

## 2018-11-19 PROCEDURE — G0480 DRUG TEST DEF 1-7 CLASSES: HCPCS

## 2018-11-19 PROCEDURE — 93005 ELECTROCARDIOGRAM TRACING: CPT

## 2018-11-19 PROCEDURE — 96374 THER/PROPH/DIAG INJ IV PUSH: CPT

## 2018-11-19 PROCEDURE — 85025 COMPLETE CBC W/AUTO DIFF WBC: CPT

## 2018-11-19 PROCEDURE — 2580000003 HC RX 258: Performed by: EMERGENCY MEDICINE

## 2018-11-19 RX ORDER — 0.9 % SODIUM CHLORIDE 0.9 %
1000 INTRAVENOUS SOLUTION INTRAVENOUS ONCE
Status: COMPLETED | OUTPATIENT
Start: 2018-11-19 | End: 2018-11-19

## 2018-11-19 RX ORDER — LORAZEPAM 2 MG/ML
2 INJECTION INTRAMUSCULAR ONCE
Status: COMPLETED | OUTPATIENT
Start: 2018-11-19 | End: 2018-11-19

## 2018-11-19 RX ADMIN — SODIUM CHLORIDE 1000 ML: 9 INJECTION, SOLUTION INTRAVENOUS at 13:24

## 2018-11-19 RX ADMIN — LORAZEPAM 2 MG: 2 INJECTION INTRAMUSCULAR; INTRAVENOUS at 13:22

## 2018-11-19 ASSESSMENT — PAIN SCALES - GENERAL: PAINLEVEL_OUTOF10: 8

## 2018-11-19 NOTE — ED PROVIDER NOTES
HISTORY       Social History     Social History    Marital status: Single     Spouse name: N/A    Number of children: N/A    Years of education: N/A     Social History Main Topics    Smoking status: Current Every Day Smoker     Types: Cigarettes    Smokeless tobacco: Never Used    Alcohol use Yes    Drug use: Unknown    Sexual activity: Not Asked     Other Topics Concern    None     Social History Narrative    None       SCREENINGS             PHYSICAL EXAM    (up to 7 for level 4, 8 or more for level 5)     ED Triage Vitals [11/19/18 1058]   BP Temp Temp src Pulse Resp SpO2 Height Weight   137/89 98.9 °F (37.2 °C) -- 100 16 97 % 5' 3\" (1.6 m) 110 lb (49.9 kg)       Physical Exam   Constitutional: He is oriented to person, place, and time. He is cooperative. HENT:   Head: Atraumatic. Mouth/Throat: Mucous membranes are not dry. No posterior oropharyngeal erythema. Eyes: Pupils are equal, round, and reactive to light. No scleral icterus. Neck: No tracheal deviation present. Cardiovascular: Normal rate, regular rhythm, normal heart sounds and intact distal pulses. No murmur heard. Pulmonary/Chest: Effort normal and breath sounds normal. No stridor. No respiratory distress. Abdominal: Soft. He exhibits no distension. There is no tenderness. There is no guarding. Musculoskeletal:        Legs:  Healed incision noted left lateral LE. Sutures in place, no surrounding erythema to indicate infection. Neurological: He is alert and oriented to person, place, and time. He has normal strength. Skin: No rash noted. No pallor. Nursing note and vitals reviewed. DIAGNOSTIC RESULTS       RADIOLOGY:  Non-plain film images such as CT, Ultrasound and MRI are read by the radiologist. Plain radiographic images are visualized and preliminarily interpreted bythe emergency physician with the below findings:      CT Head WO Contrast   Final Result   1. No acute intracranial process.        Signed by  Jessie Mauricio on 11/19/2018 2:15 PM              LABS:  Labs Reviewed   COMPREHENSIVE METABOLIC PANEL - Abnormal; Notable for the following:        Result Value    Sodium 134 (*)     Chloride 94 (*)     BUN 4 (*)     Alkaline Phosphatase 135 (*)     All other components within normal limits   CBC WITH AUTO DIFFERENTIAL - Abnormal; Notable for the following:     RBC 4.38 (*)     Hemoglobin 13.2 (*)     Hematocrit 41.1 (*)     MCHC 32.1 (*)     RDW 15.2 (*)     Neutrophils % 68.3 (*)     Lymphocytes % 16.3 (*)     Monocytes % 12.5 (*)     Lymphocytes # 1.0 (*)     All other components within normal limits   MAGNESIUM - Abnormal; Notable for the following:     Magnesium 1.5 (*)     All other components within normal limits   ETHANOL       All other labs were within normal range or not returned as of this dictation. EMERGENCY DEPARTMENT COURSE and DIFFERENTIAL DIAGNOSIS/MDM:   Vitals:    Vitals:    11/19/18 1058   BP: 137/89   Pulse: 100   Resp: 16   Temp: 98.9 °F (37.2 °C)   SpO2: 97%   Weight: 110 lb (49.9 kg)   Height: 5' 3\" (1.6 m)       MDM    Reassessment    Patient able to ambulate in the ED without difficulty. Sutures were removed. PROCEDURES:  Unless otherwise noted below, none     Procedures    FINAL IMPRESSION      1. Alcoholism ([address]    2. Other chronic pain          DISPOSITION/PLAN   DISPOSITION Decision To Discharge 11/19/2018 03:09:32 PM      PATIENT REFERRED TO:  Nirav Castañeda MD  [address] [place] [address] [phone]            DISCHARGE MEDICATIONS:  There are no discharge medications for this patient.          (Please note that portions of this note were completed with a voice recognition program.  Efforts were made to edit thedictations but occasionally words are mis-transcribed.)    Gemini Montesinos MD (electronically signed)  Attending Emergency Physician         Gemini Montesinos MD  12/21/18 3130

## 2018-11-20 LAB
EKG P AXIS: 86 DEGREES
EKG P-R INTERVAL: 172 MS
EKG Q-T INTERVAL: 354 MS
EKG QRS DURATION: 84 MS
EKG QTC CALCULATION (BAZETT): 422 MS
EKG T AXIS: 70 DEGREES

## 2018-12-12 ENCOUNTER — APPOINTMENT (OUTPATIENT)
Dept: GENERAL RADIOLOGY | Age: 43
End: 2018-12-12
Payer: MEDICAID

## 2018-12-12 ENCOUNTER — HOSPITAL ENCOUNTER (EMERGENCY)
Age: 43
Discharge: HOME | End: 2018-12-12
Attending: EMERGENCY MEDICINE
Payer: MEDICAID

## 2018-12-12 VITALS
WEIGHT: 110 LBS | SYSTOLIC BLOOD PRESSURE: 112 MMHG | OXYGEN SATURATION: 94 % | TEMPERATURE: 97.8 F | HEART RATE: 113 BPM | HEIGHT: 63 IN | BODY MASS INDEX: 19.49 KG/M2 | RESPIRATION RATE: 19 BRPM | DIASTOLIC BLOOD PRESSURE: 68 MMHG

## 2018-12-12 DIAGNOSIS — F10.929 ACUTE ALCOHOLIC INTOXICATION WITH COMPLICATION (HCC): Primary | ICD-10-CM

## 2018-12-12 DIAGNOSIS — R52 POSTTRAUMATIC PAIN: ICD-10-CM

## 2018-12-12 DIAGNOSIS — F10.20 CHRONIC ALCOHOLISM (HCC): ICD-10-CM

## 2018-12-12 DIAGNOSIS — Z59.00 HOMELESSNESS: ICD-10-CM

## 2018-12-12 LAB
ALBUMIN SERPL-MCNC: 3.9 G/DL (ref 3.5–5.2)
ALP BLD-CCNC: 114 U/L (ref 40–130)
ALT SERPL-CCNC: 16 U/L (ref 5–41)
AMPHETAMINE SCREEN, URINE: NEGATIVE
AMYLASE: 76 U/L (ref 28–100)
ANION GAP SERPL CALCULATED.3IONS-SCNC: 10 MMOL/L (ref 7–19)
AST SERPL-CCNC: 30 U/L (ref 5–40)
BARBITURATE SCREEN URINE: NEGATIVE
BASOPHILS ABSOLUTE: 0 K/UL (ref 0–0.2)
BASOPHILS RELATIVE PERCENT: 0.8 % (ref 0–1)
BENZODIAZEPINE SCREEN, URINE: POSITIVE
BILIRUB SERPL-MCNC: <0.2 MG/DL (ref 0.2–1.2)
BILIRUBIN URINE: NEGATIVE
BLOOD, URINE: NEGATIVE
BUN BLDV-MCNC: 7 MG/DL (ref 6–20)
CALCIUM SERPL-MCNC: 8.6 MG/DL (ref 8.6–10)
CANNABINOID SCREEN URINE: NEGATIVE
CHLORIDE BLD-SCNC: 100 MMOL/L (ref 98–111)
CLARITY: CLEAR
CO2: 28 MMOL/L (ref 22–29)
COCAINE METABOLITE SCREEN URINE: NEGATIVE
COLOR: YELLOW
CREAT SERPL-MCNC: 0.5 MG/DL (ref 0.5–1.2)
EOSINOPHILS ABSOLUTE: 0.1 K/UL (ref 0–0.6)
EOSINOPHILS RELATIVE PERCENT: 2.5 % (ref 0–5)
ETHANOL: 321 MG/DL (ref 0–0.08)
GFR NON-AFRICAN AMERICAN: >60
GLUCOSE BLD-MCNC: 91 MG/DL (ref 74–109)
GLUCOSE URINE: NEGATIVE MG/DL
HCT VFR BLD CALC: 41.8 % (ref 42–52)
HEMOGLOBIN: 13.3 G/DL (ref 14–18)
KETONES, URINE: NEGATIVE MG/DL
LACTIC ACID: 1.4 MMOL/L (ref 0.5–1.9)
LEUKOCYTE ESTERASE, URINE: NEGATIVE
LIPASE: 27 U/L (ref 13–60)
LYMPHOCYTES ABSOLUTE: 1.6 K/UL (ref 1.1–4.5)
LYMPHOCYTES RELATIVE PERCENT: 30.8 % (ref 20–40)
Lab: ABNORMAL
MCH RBC QN AUTO: 28.9 PG (ref 27–31)
MCHC RBC AUTO-ENTMCNC: 31.8 G/DL (ref 33–37)
MCV RBC AUTO: 90.7 FL (ref 80–94)
MONOCYTES ABSOLUTE: 0.6 K/UL (ref 0–0.9)
MONOCYTES RELATIVE PERCENT: 11.2 % (ref 0–10)
NEUTROPHILS ABSOLUTE: 2.8 K/UL (ref 1.5–7.5)
NEUTROPHILS RELATIVE PERCENT: 54.5 % (ref 50–65)
NITRITE, URINE: NEGATIVE
OPIATE SCREEN URINE: NEGATIVE
PDW BLD-RTO: 15.4 % (ref 11.5–14.5)
PH UA: 6
PLATELET # BLD: 197 K/UL (ref 130–400)
PMV BLD AUTO: 10 FL (ref 9.4–12.4)
POTASSIUM REFLEX MAGNESIUM: 4.2 MMOL/L (ref 3.5–5)
PROTEIN UA: NEGATIVE MG/DL
RBC # BLD: 4.61 M/UL (ref 4.7–6.1)
SODIUM BLD-SCNC: 138 MMOL/L (ref 136–145)
SPECIFIC GRAVITY UA: 1.02
TOTAL PROTEIN: 7.4 G/DL (ref 6.6–8.7)
URINE REFLEX TO CULTURE: NORMAL
UROBILINOGEN, URINE: 0.2 E.U./DL
WBC # BLD: 5.2 K/UL (ref 4.8–10.8)

## 2018-12-12 PROCEDURE — 80307 DRUG TEST PRSMV CHEM ANLYZR: CPT

## 2018-12-12 PROCEDURE — 82150 ASSAY OF AMYLASE: CPT

## 2018-12-12 PROCEDURE — 83605 ASSAY OF LACTIC ACID: CPT

## 2018-12-12 PROCEDURE — 73630 X-RAY EXAM OF FOOT: CPT

## 2018-12-12 PROCEDURE — 99284 EMERGENCY DEPT VISIT MOD MDM: CPT | Performed by: EMERGENCY MEDICINE

## 2018-12-12 PROCEDURE — 71045 X-RAY EXAM CHEST 1 VIEW: CPT

## 2018-12-12 PROCEDURE — 2580000003 HC RX 258: Performed by: EMERGENCY MEDICINE

## 2018-12-12 PROCEDURE — 80053 COMPREHEN METABOLIC PANEL: CPT

## 2018-12-12 PROCEDURE — 99285 EMERGENCY DEPT VISIT HI MDM: CPT

## 2018-12-12 PROCEDURE — 83690 ASSAY OF LIPASE: CPT

## 2018-12-12 PROCEDURE — 93005 ELECTROCARDIOGRAM TRACING: CPT

## 2018-12-12 PROCEDURE — 6360000002 HC RX W HCPCS: Performed by: EMERGENCY MEDICINE

## 2018-12-12 PROCEDURE — 96374 THER/PROPH/DIAG INJ IV PUSH: CPT

## 2018-12-12 PROCEDURE — 81003 URINALYSIS AUTO W/O SCOPE: CPT

## 2018-12-12 PROCEDURE — G0480 DRUG TEST DEF 1-7 CLASSES: HCPCS

## 2018-12-12 PROCEDURE — 73610 X-RAY EXAM OF ANKLE: CPT

## 2018-12-12 PROCEDURE — 36415 COLL VENOUS BLD VENIPUNCTURE: CPT

## 2018-12-12 PROCEDURE — 2500000003 HC RX 250 WO HCPCS: Performed by: EMERGENCY MEDICINE

## 2018-12-12 PROCEDURE — 85025 COMPLETE CBC W/AUTO DIFF WBC: CPT

## 2018-12-12 RX ORDER — SUCRALFATE 1 G/1
1 TABLET ORAL
COMMUNITY

## 2018-12-12 RX ORDER — LORAZEPAM 2 MG/ML
1 INJECTION INTRAMUSCULAR EVERY 30 MIN PRN
Status: DISCONTINUED | OUTPATIENT
Start: 2018-12-12 | End: 2018-12-12 | Stop reason: HOSPADM

## 2018-12-12 RX ORDER — GABAPENTIN 300 MG/1
300 CAPSULE ORAL
COMMUNITY
Start: 2018-10-12

## 2018-12-12 RX ORDER — SENNA PLUS 8.6 MG/1
1 TABLET ORAL DAILY
COMMUNITY

## 2018-12-12 RX ORDER — CHLORDIAZEPOXIDE HYDROCHLORIDE 25 MG/1
25 CAPSULE, GELATIN COATED ORAL
COMMUNITY

## 2018-12-12 RX ORDER — LISINOPRIL 10 MG/1
10 TABLET ORAL
COMMUNITY
Start: 2018-02-26

## 2018-12-12 RX ORDER — LORAZEPAM 1 MG/1
TABLET ORAL
Qty: 20 TABLET | Refills: 0 | Status: SHIPPED | OUTPATIENT
Start: 2018-12-12 | End: 2018-12-20

## 2018-12-12 RX ADMIN — LORAZEPAM 1 MG: 2 INJECTION INTRAMUSCULAR; INTRAVENOUS at 12:13

## 2018-12-12 SDOH — ECONOMIC STABILITY - HOUSING INSECURITY: HOMELESSNESS UNSPECIFIED: Z59.00

## 2018-12-12 ASSESSMENT — ENCOUNTER SYMPTOMS
BLOOD IN STOOL: 0
FACIAL SWELLING: 0
SINUS PRESSURE: 0
SORE THROAT: 0
CHOKING: 0
NAUSEA: 0
DIARRHEA: 0
SHORTNESS OF BREATH: 0
EYE DISCHARGE: 0
VOICE CHANGE: 0
CONSTIPATION: 0
APNEA: 0

## 2018-12-12 NOTE — ED PROVIDER NOTES
reason I would give him a prescription if he serious. I will be surprised if he continues to drink and take the medication. He might even sell the medication for alcohol. But I don't have a solid reason for admission is not a psychiatric admission. So the only thing I can offer him is a a some medication to help with drawl in an open door that he gets worse or something changes he can come back. But I encourage AA it's free. [facility]. He does have a medical provider that he can see. CONSULTS:  None    PROCEDURES:  Unless otherwise notedbelow, none     Procedures    FINAL IMPRESSION     1. Acute alcoholic intoxication with complication ([facility]    2. Chronic alcoholism ([facility]    3. Homelessness    4. Posttraumatic pain          DISPOSITION/PLAN   DISPOSITION Decision To Discharge 12/12/2018 03:24:49 PM      PATIENT REFERRED TO:  @FUP@    DISCHARGE MEDICATIONS:  New Prescriptions    LORAZEPAM (ATIVAN) 1 MG TABLET    1 tablet 4 times a day for 2 days then 1 tablet 3 times a day for 2 days then 1 tablet twice a day for 2 days then 1 tablet at bedtime until supply is gone. Do not drink alcohol on this medication. This is for alcohol withdrawal..     Attestation: The Prescription Monitoring Report for this patient was reviewed today.  [facility] #[id number]) Sav Mensah MD)    (Please note that portions of this note were completed with a voice recognition program.  Efforts were made to edit the dictations butoccasionally words are mis-transcribed.)    Sav Mensah MD (electronically signed)  AttendingEmer[facility] Physician          Sav Mensah MD  12/12/18 [id number] [address] Nohemi Tavares MD  12/12/18 3030

## 2018-12-13 LAB
EKG P AXIS: 79 DEGREES
EKG P-R INTERVAL: 178 MS
EKG Q-T INTERVAL: 388 MS
EKG QRS DURATION: 88 MS
EKG QTC CALCULATION (BAZETT): 417 MS
EKG T AXIS: 72 DEGREES

## 2018-12-21 ASSESSMENT — ENCOUNTER SYMPTOMS
ABDOMINAL PAIN: 0
SHORTNESS OF BREATH: 0
VOMITING: 0

## 2019-11-23 ENCOUNTER — APPOINTMENT (OUTPATIENT)
Dept: CT IMAGING | Age: 44
End: 2019-11-23
Payer: COMMERCIAL

## 2019-11-23 ENCOUNTER — HOSPITAL ENCOUNTER (EMERGENCY)
Age: 44
Discharge: HOME | End: 2019-11-23
Attending: EMERGENCY MEDICINE
Payer: COMMERCIAL

## 2019-11-23 VITALS
WEIGHT: 110 LBS | HEIGHT: 65 IN | OXYGEN SATURATION: 97 % | SYSTOLIC BLOOD PRESSURE: 118 MMHG | HEART RATE: 81 BPM | RESPIRATION RATE: 18 BRPM | TEMPERATURE: 98 F | BODY MASS INDEX: 18.33 KG/M2 | DIASTOLIC BLOOD PRESSURE: 71 MMHG

## 2019-11-23 DIAGNOSIS — F10.920 ACUTE ALCOHOLIC INTOXICATION WITHOUT COMPLICATION (HCC): Primary | ICD-10-CM

## 2019-11-23 LAB
ACETAMINOPHEN LEVEL: <15 UG/ML
ALBUMIN SERPL-MCNC: 4.1 G/DL (ref 3.5–5.2)
ALP BLD-CCNC: 93 U/L (ref 40–130)
ALT SERPL-CCNC: 46 U/L (ref 5–41)
AMPHETAMINE SCREEN, URINE: NEGATIVE
ANION GAP SERPL CALCULATED.3IONS-SCNC: 13 MMOL/L (ref 7–19)
AST SERPL-CCNC: 68 U/L (ref 5–40)
BARBITURATE SCREEN URINE: NEGATIVE
BASOPHILS ABSOLUTE: 0.1 K/UL (ref 0–0.2)
BASOPHILS RELATIVE PERCENT: 1.8 % (ref 0–1)
BENZODIAZEPINE SCREEN, URINE: NEGATIVE
BILIRUB SERPL-MCNC: <0.2 MG/DL (ref 0.2–1.2)
BILIRUBIN URINE: NEGATIVE
BLOOD, URINE: NEGATIVE
BUN BLDV-MCNC: 10 MG/DL (ref 6–20)
CALCIUM SERPL-MCNC: 8.7 MG/DL (ref 8.6–10)
CANNABINOID SCREEN URINE: NEGATIVE
CHLORIDE BLD-SCNC: 97 MMOL/L (ref 98–111)
CLARITY: CLEAR
CO2: 34 MMOL/L (ref 22–29)
COCAINE METABOLITE SCREEN URINE: NEGATIVE
COLOR: ABNORMAL
CREAT SERPL-MCNC: 0.6 MG/DL (ref 0.5–1.2)
EOSINOPHILS ABSOLUTE: 0.2 K/UL (ref 0–0.6)
EOSINOPHILS RELATIVE PERCENT: 3.6 % (ref 0–5)
ETHANOL: 529 MG/DL (ref 0–0.08)
GFR NON-AFRICAN AMERICAN: >60
GLUCOSE BLD-MCNC: 103 MG/DL (ref 74–109)
GLUCOSE URINE: NEGATIVE MG/DL
HCT VFR BLD CALC: 40.9 % (ref 42–52)
HEMOGLOBIN: 13.5 G/DL (ref 14–18)
IMMATURE GRANULOCYTES #: 0 K/UL
KETONES, URINE: NEGATIVE MG/DL
LEUKOCYTE ESTERASE, URINE: NEGATIVE
LYMPHOCYTES ABSOLUTE: 1.3 K/UL (ref 1.1–4.5)
LYMPHOCYTES RELATIVE PERCENT: 25.8 % (ref 20–40)
Lab: NORMAL
MCH RBC QN AUTO: 32.6 PG (ref 27–31)
MCHC RBC AUTO-ENTMCNC: 33 G/DL (ref 33–37)
MCV RBC AUTO: 98.8 FL (ref 80–94)
MONOCYTES ABSOLUTE: 1 K/UL (ref 0–0.9)
MONOCYTES RELATIVE PERCENT: 19.8 % (ref 0–10)
NEUTROPHILS ABSOLUTE: 2.4 K/UL (ref 1.5–7.5)
NEUTROPHILS RELATIVE PERCENT: 48.8 % (ref 50–65)
NITRITE, URINE: NEGATIVE
OPIATE SCREEN URINE: NEGATIVE
PDW BLD-RTO: 15.2 % (ref 11.5–14.5)
PH UA: 7 (ref 5–8)
PLATELET # BLD: 215 K/UL (ref 130–400)
PMV BLD AUTO: 9.5 FL (ref 9.4–12.4)
POTASSIUM SERPL-SCNC: 4.1 MMOL/L (ref 3.5–5)
PROTEIN UA: NEGATIVE MG/DL
RBC # BLD: 4.14 M/UL (ref 4.7–6.1)
SALICYLATE, SERUM: <3 MG/DL (ref 3–10)
SODIUM BLD-SCNC: 144 MMOL/L (ref 136–145)
SPECIFIC GRAVITY UA: 1.01 (ref 1–1.03)
TOTAL PROTEIN: 7.3 G/DL (ref 6.6–8.7)
URINE REFLEX TO CULTURE: ABNORMAL
UROBILINOGEN, URINE: 0.2 E.U./DL
WBC # BLD: 5 K/UL (ref 4.8–10.8)

## 2019-11-23 PROCEDURE — 36415 COLL VENOUS BLD VENIPUNCTURE: CPT

## 2019-11-23 PROCEDURE — 80307 DRUG TEST PRSMV CHEM ANLYZR: CPT

## 2019-11-23 PROCEDURE — 99285 EMERGENCY DEPT VISIT HI MDM: CPT

## 2019-11-23 PROCEDURE — G0480 DRUG TEST DEF 1-7 CLASSES: HCPCS

## 2019-11-23 PROCEDURE — 80053 COMPREHEN METABOLIC PANEL: CPT

## 2019-11-23 PROCEDURE — 81003 URINALYSIS AUTO W/O SCOPE: CPT

## 2019-11-23 PROCEDURE — 70450 CT HEAD/BRAIN W/O DYE: CPT

## 2019-11-23 PROCEDURE — 93005 ELECTROCARDIOGRAM TRACING: CPT | Performed by: EMERGENCY MEDICINE

## 2019-11-23 PROCEDURE — 85025 COMPLETE CBC W/AUTO DIFF WBC: CPT

## 2019-11-24 LAB
EKG P AXIS: 79 DEGREES
EKG P-R INTERVAL: 166 MS
EKG Q-T INTERVAL: 330 MS
EKG QRS DURATION: 80 MS
EKG QTC CALCULATION (BAZETT): 407 MS
EKG T AXIS: 55 DEGREES

## 2019-11-24 PROCEDURE — 93010 ELECTROCARDIOGRAM REPORT: CPT | Performed by: INTERNAL MEDICINE

## 2019-12-20 ENCOUNTER — APPOINTMENT (OUTPATIENT)
Dept: CT IMAGING | Age: 44
End: 2019-12-20
Payer: COMMERCIAL

## 2019-12-20 ENCOUNTER — HOSPITAL ENCOUNTER (EMERGENCY)
Age: 44
Discharge: HOME | End: 2019-12-20
Attending: EMERGENCY MEDICINE
Payer: COMMERCIAL

## 2019-12-20 ENCOUNTER — APPOINTMENT (OUTPATIENT)
Dept: GENERAL RADIOLOGY | Age: 44
End: 2019-12-20
Payer: COMMERCIAL

## 2019-12-20 VITALS
RESPIRATION RATE: 16 BRPM | HEIGHT: 65 IN | BODY MASS INDEX: 18.33 KG/M2 | WEIGHT: 110 LBS | HEART RATE: 105 BPM | SYSTOLIC BLOOD PRESSURE: 126 MMHG | OXYGEN SATURATION: 95 % | DIASTOLIC BLOOD PRESSURE: 80 MMHG | TEMPERATURE: 98.9 F

## 2019-12-20 DIAGNOSIS — S00.03XA CONTUSION OF SCALP, INITIAL ENCOUNTER: ICD-10-CM

## 2019-12-20 DIAGNOSIS — F10.930 ALCOHOL WITHDRAWAL SEIZURE WITHOUT COMPLICATION (HCC): Primary | ICD-10-CM

## 2019-12-20 DIAGNOSIS — R56.9 ALCOHOL WITHDRAWAL SEIZURE WITHOUT COMPLICATION (HCC): Primary | ICD-10-CM

## 2019-12-20 LAB
ALBUMIN SERPL-MCNC: 4.7 G/DL (ref 3.5–5.2)
ALP BLD-CCNC: 81 U/L (ref 40–130)
ALT SERPL-CCNC: 16 U/L (ref 5–41)
AMPHETAMINE SCREEN, URINE: NEGATIVE
ANION GAP SERPL CALCULATED.3IONS-SCNC: 19 MMOL/L (ref 7–19)
AST SERPL-CCNC: 37 U/L (ref 5–40)
BACTERIA: NEGATIVE /HPF
BARBITURATE SCREEN URINE: NEGATIVE
BASOPHILS ABSOLUTE: 0.1 K/UL (ref 0–0.2)
BASOPHILS RELATIVE PERCENT: 0.6 % (ref 0–1)
BENZODIAZEPINE SCREEN, URINE: POSITIVE
BILIRUB SERPL-MCNC: 0.7 MG/DL (ref 0.2–1.2)
BILIRUBIN URINE: NEGATIVE
BLOOD, URINE: NEGATIVE
BUN BLDV-MCNC: 5 MG/DL (ref 6–20)
CALCIUM SERPL-MCNC: 9.4 MG/DL (ref 8.6–10)
CANNABINOID SCREEN URINE: NEGATIVE
CHLORIDE BLD-SCNC: 85 MMOL/L (ref 98–111)
CLARITY: CLEAR
CO2: 27 MMOL/L (ref 22–29)
COCAINE METABOLITE SCREEN URINE: NEGATIVE
COLOR: YELLOW
CREAT SERPL-MCNC: 0.6 MG/DL (ref 0.5–1.2)
EOSINOPHILS ABSOLUTE: 0.1 K/UL (ref 0–0.6)
EOSINOPHILS RELATIVE PERCENT: 1 % (ref 0–5)
EPITHELIAL CELLS, UA: 1 /HPF (ref 0–5)
ETHANOL: <10 MG/DL (ref 0–0.08)
GFR NON-AFRICAN AMERICAN: >60
GLUCOSE BLD-MCNC: 137 MG/DL (ref 74–109)
GLUCOSE URINE: NEGATIVE MG/DL
HCT VFR BLD CALC: 38.3 % (ref 42–52)
HEMOGLOBIN: 13.2 G/DL (ref 14–18)
HYALINE CASTS: 1 /HPF (ref 0–8)
IMMATURE GRANULOCYTES #: 0 K/UL
KETONES, URINE: NEGATIVE MG/DL
LEUKOCYTE ESTERASE, URINE: NEGATIVE
LYMPHOCYTES ABSOLUTE: 0.7 K/UL (ref 1.1–4.5)
LYMPHOCYTES RELATIVE PERCENT: 8.8 % (ref 20–40)
Lab: ABNORMAL
MAGNESIUM: 1.6 MG/DL (ref 1.6–2.6)
MCH RBC QN AUTO: 32.8 PG (ref 27–31)
MCHC RBC AUTO-ENTMCNC: 34.5 G/DL (ref 33–37)
MCV RBC AUTO: 95 FL (ref 80–94)
MONOCYTES ABSOLUTE: 1.5 K/UL (ref 0–0.9)
MONOCYTES RELATIVE PERCENT: 18.5 % (ref 0–10)
NEUTROPHILS ABSOLUTE: 5.8 K/UL (ref 1.5–7.5)
NEUTROPHILS RELATIVE PERCENT: 70.9 % (ref 50–65)
NITRITE, URINE: NEGATIVE
OPIATE SCREEN URINE: NEGATIVE
PDW BLD-RTO: 13.6 % (ref 11.5–14.5)
PH UA: 7.5 (ref 5–8)
PLATELET # BLD: 153 K/UL (ref 130–400)
PMV BLD AUTO: 10 FL (ref 9.4–12.4)
POTASSIUM REFLEX MAGNESIUM: 3.4 MMOL/L (ref 3.5–5)
PROTEIN UA: 100 MG/DL
RBC # BLD: 4.03 M/UL (ref 4.7–6.1)
RBC UA: 2 /HPF (ref 0–4)
SODIUM BLD-SCNC: 131 MMOL/L (ref 136–145)
SPECIFIC GRAVITY UA: 1.01 (ref 1–1.03)
TOTAL PROTEIN: 7.9 G/DL (ref 6.6–8.7)
URINE REFLEX TO CULTURE: ABNORMAL
UROBILINOGEN, URINE: 0.2 E.U./DL
WBC # BLD: 8.2 K/UL (ref 4.8–10.8)
WBC UA: 1 /HPF (ref 0–5)

## 2019-12-20 PROCEDURE — 72125 CT NECK SPINE W/O DYE: CPT

## 2019-12-20 PROCEDURE — 36415 COLL VENOUS BLD VENIPUNCTURE: CPT

## 2019-12-20 PROCEDURE — 93005 ELECTROCARDIOGRAM TRACING: CPT | Performed by: EMERGENCY MEDICINE

## 2019-12-20 PROCEDURE — 96366 THER/PROPH/DIAG IV INF ADDON: CPT

## 2019-12-20 PROCEDURE — 96375 TX/PRO/DX INJ NEW DRUG ADDON: CPT

## 2019-12-20 PROCEDURE — 96365 THER/PROPH/DIAG IV INF INIT: CPT

## 2019-12-20 PROCEDURE — G0480 DRUG TEST DEF 1-7 CLASSES: HCPCS

## 2019-12-20 PROCEDURE — 2500000003 HC RX 250 WO HCPCS: Performed by: EMERGENCY MEDICINE

## 2019-12-20 PROCEDURE — 2580000003 HC RX 258: Performed by: EMERGENCY MEDICINE

## 2019-12-20 PROCEDURE — 80307 DRUG TEST PRSMV CHEM ANLYZR: CPT

## 2019-12-20 PROCEDURE — 71045 X-RAY EXAM CHEST 1 VIEW: CPT

## 2019-12-20 PROCEDURE — 81001 URINALYSIS AUTO W/SCOPE: CPT

## 2019-12-20 PROCEDURE — 83735 ASSAY OF MAGNESIUM: CPT

## 2019-12-20 PROCEDURE — 6360000002 HC RX W HCPCS: Performed by: EMERGENCY MEDICINE

## 2019-12-20 PROCEDURE — 70450 CT HEAD/BRAIN W/O DYE: CPT

## 2019-12-20 PROCEDURE — 99285 EMERGENCY DEPT VISIT HI MDM: CPT

## 2019-12-20 PROCEDURE — 80053 COMPREHEN METABOLIC PANEL: CPT

## 2019-12-20 PROCEDURE — 85025 COMPLETE CBC W/AUTO DIFF WBC: CPT

## 2019-12-20 RX ORDER — DIAZEPAM 10 MG/1
TABLET ORAL
Qty: 20 TABLET | Refills: 0 | Status: SHIPPED | OUTPATIENT
Start: 2019-12-20 | End: 2019-12-28

## 2019-12-20 RX ORDER — LORAZEPAM 2 MG/ML
1 INJECTION INTRAMUSCULAR ONCE
Status: COMPLETED | OUTPATIENT
Start: 2019-12-20 | End: 2019-12-20

## 2019-12-20 RX ADMIN — LORAZEPAM 1 MG: 2 INJECTION INTRAMUSCULAR; INTRAVENOUS at 10:27

## 2019-12-20 RX ADMIN — FOLIC ACID: 5 INJECTION, SOLUTION INTRAMUSCULAR; INTRAVENOUS; SUBCUTANEOUS at 10:26

## 2019-12-20 ASSESSMENT — ENCOUNTER SYMPTOMS
CHOKING: 0
ABDOMINAL PAIN: 0
VOICE CHANGE: 0
EYE DISCHARGE: 0
FACIAL SWELLING: 0
BLOOD IN STOOL: 0
NAUSEA: 0
SORE THROAT: 0
SINUS PRESSURE: 0
APNEA: 0
DIARRHEA: 0
CONSTIPATION: 0

## 2019-12-23 LAB
EKG P AXIS: 69 DEGREES
EKG P-R INTERVAL: 178 MS
EKG Q-T INTERVAL: 350 MS
EKG QRS DURATION: 86 MS
EKG QTC CALCULATION (BAZETT): 421 MS
EKG T AXIS: 63 DEGREES

## 2019-12-23 PROCEDURE — 93010 ELECTROCARDIOGRAM REPORT: CPT | Performed by: INTERNAL MEDICINE

## 2020-06-01 ENCOUNTER — TELEPHONE (OUTPATIENT)
Dept: FAMILY MEDICINE CLINIC | Facility: CLINIC | Age: 45
End: 2020-06-01

## 2020-06-01 NOTE — TELEPHONE ENCOUNTER
Alexis Ferrera at [facility] called and states pt was admitted 5/26/20 with alcohol intoxication and insurance denying stay. Wants to know if ok to change to observation. Will also need to change admission from 5/31/20.  [phone] ext [phone]

## 2020-06-08 ENCOUNTER — HOSPITAL ENCOUNTER (EMERGENCY)
Facility: HOSPITAL | Age: 45
Discharge: HOME | End: 2020-06-09
Attending: EMERGENCY MEDICINE | Admitting: EMERGENCY MEDICINE

## 2020-06-08 ENCOUNTER — APPOINTMENT (OUTPATIENT)
Dept: CT IMAGING | Facility: HOSPITAL | Age: 45
End: 2020-06-08

## 2020-06-08 DIAGNOSIS — F10.929 ALCOHOLIC INTOXICATION WITH COMPLICATION (HCC): Primary | ICD-10-CM

## 2020-06-08 LAB
ALBUMIN SERPL-MCNC: 3.9 G/DL (ref 3.5–5.2)
ALBUMIN/GLOB SERPL: 1.6 G/DL
ALP SERPL-CCNC: 57 U/L (ref 39–117)
ALT SERPL W P-5'-P-CCNC: 63 U/L (ref 1–41)
AMMONIA BLD-SCNC: 29 UMOL/L (ref 16–60)
ANION GAP SERPL CALCULATED.3IONS-SCNC: 13 MMOL/L (ref 5–15)
ANISOCYTOSIS BLD QL: ABNORMAL
APTT PPP: 27.2 SECONDS (ref 24.1–35)
AST SERPL-CCNC: 78 U/L (ref 1–40)
BASOPHILS # BLD MANUAL: 0.1 10*3/MM3 (ref 0–0.2)
BASOPHILS NFR BLD AUTO: 2.1 % (ref 0–1.5)
BILIRUB SERPL-MCNC: 0.2 MG/DL (ref 0.2–1.2)
BUN BLD-MCNC: 8 MG/DL (ref 6–20)
BUN/CREAT SERPL: 14.5 (ref 7–25)
CALCIUM SPEC-SCNC: 8 MG/DL (ref 8.6–10.5)
CHLORIDE SERPL-SCNC: 105 MMOL/L (ref 98–107)
CO2 SERPL-SCNC: 27 MMOL/L (ref 22–29)
CREAT BLD-MCNC: 0.55 MG/DL (ref 0.76–1.27)
DEPRECATED RDW RBC AUTO: 50.5 FL (ref 37–54)
EOSINOPHIL # BLD MANUAL: 0.24 10*3/MM3 (ref 0–0.4)
EOSINOPHIL NFR BLD MANUAL: 5.2 % (ref 0.3–6.2)
ERYTHROCYTE [DISTWIDTH] IN BLOOD BY AUTOMATED COUNT: 14.6 % (ref 12.3–15.4)
ETHANOL UR QL: 0.36 %
GFR SERPL CREATININE-BSD FRML MDRD: >150 ML/MIN/1.73
GIANT PLATELETS: ABNORMAL
GLOBULIN UR ELPH-MCNC: 2.5 GM/DL
GLUCOSE BLD-MCNC: 86 MG/DL (ref 65–99)
HCT VFR BLD AUTO: 30.1 % (ref 37.5–51)
HGB BLD-MCNC: 9.9 G/DL (ref 13–17.7)
HYPOCHROMIA BLD QL: ABNORMAL
INR PPP: 1.23 (ref 0.91–1.09)
LIPASE SERPL-CCNC: 24 U/L (ref 13–60)
LYMPHOCYTES # BLD MANUAL: 1.11 10*3/MM3 (ref 0.7–3.1)
LYMPHOCYTES NFR BLD MANUAL: 16.5 % (ref 5–12)
LYMPHOCYTES NFR BLD MANUAL: 23.7 % (ref 19.6–45.3)
MCH RBC QN AUTO: 30.9 PG (ref 26.6–33)
MCHC RBC AUTO-ENTMCNC: 32.9 G/DL (ref 31.5–35.7)
MCV RBC AUTO: 94.1 FL (ref 79–97)
MICROCYTES BLD QL: ABNORMAL
MONOCYTES # BLD AUTO: 0.78 10*3/MM3 (ref 0.1–0.9)
NEUTROPHILS # BLD AUTO: 2.42 10*3/MM3 (ref 1.7–7)
NEUTROPHILS NFR BLD MANUAL: 51.5 % (ref 42.7–76)
PLATELET # BLD AUTO: 233 10*3/MM3 (ref 140–450)
PMV BLD AUTO: 9.4 FL (ref 6–12)
POLYCHROMASIA BLD QL SMEAR: ABNORMAL
POTASSIUM BLD-SCNC: 3.8 MMOL/L (ref 3.5–5.2)
PROT SERPL-MCNC: 6.4 G/DL (ref 6–8.5)
PROTHROMBIN TIME: 15.1 SECONDS (ref 11.9–14.6)
RBC # BLD AUTO: 3.2 10*6/MM3 (ref 4.14–5.8)
SODIUM BLD-SCNC: 145 MMOL/L (ref 136–145)
VARIANT LYMPHS NFR BLD MANUAL: 1 % (ref 0–5)
WBC MORPH BLD: NORMAL
WBC NRBC COR # BLD: 4.7 10*3/MM3 (ref 3.4–10.8)

## 2020-06-08 PROCEDURE — 96360 HYDRATION IV INFUSION INIT: CPT

## 2020-06-08 PROCEDURE — 80307 DRUG TEST PRSMV CHEM ANLYZR: CPT | Performed by: EMERGENCY MEDICINE

## 2020-06-08 PROCEDURE — 82140 ASSAY OF AMMONIA: CPT | Performed by: EMERGENCY MEDICINE

## 2020-06-08 PROCEDURE — 70450 CT HEAD/BRAIN W/O DYE: CPT

## 2020-06-08 PROCEDURE — 85730 THROMBOPLASTIN TIME PARTIAL: CPT | Performed by: EMERGENCY MEDICINE

## 2020-06-08 PROCEDURE — 96361 HYDRATE IV INFUSION ADD-ON: CPT

## 2020-06-08 PROCEDURE — 85610 PROTHROMBIN TIME: CPT | Performed by: EMERGENCY MEDICINE

## 2020-06-08 PROCEDURE — 85007 BL SMEAR W/DIFF WBC COUNT: CPT | Performed by: EMERGENCY MEDICINE

## 2020-06-08 PROCEDURE — 99285 EMERGENCY DEPT VISIT HI MDM: CPT

## 2020-06-08 PROCEDURE — 85025 COMPLETE CBC W/AUTO DIFF WBC: CPT | Performed by: EMERGENCY MEDICINE

## 2020-06-08 PROCEDURE — 80053 COMPREHEN METABOLIC PANEL: CPT | Performed by: EMERGENCY MEDICINE

## 2020-06-08 PROCEDURE — 83690 ASSAY OF LIPASE: CPT | Performed by: EMERGENCY MEDICINE

## 2020-06-08 RX ORDER — SODIUM CHLORIDE 9 MG/ML
125 INJECTION, SOLUTION INTRAVENOUS CONTINUOUS
Status: DISCONTINUED | OUTPATIENT
Start: 2020-06-08 | End: 2020-06-09 | Stop reason: HOSPADM

## 2020-06-08 RX ORDER — SODIUM CHLORIDE 0.9 % (FLUSH) 0.9 %
10 SYRINGE (ML) INJECTION AS NEEDED
Status: DISCONTINUED | OUTPATIENT
Start: 2020-06-08 | End: 2020-06-09 | Stop reason: HOSPADM

## 2020-06-08 RX ORDER — LEVETIRACETAM 500 MG/1
500 TABLET ORAL 2 TIMES DAILY
COMMUNITY

## 2020-06-08 RX ADMIN — SODIUM CHLORIDE 125 ML/HR: 9 INJECTION, SOLUTION INTRAVENOUS at 20:13

## 2020-06-09 VITALS
BODY MASS INDEX: 22.36 KG/M2 | TEMPERATURE: 97.4 F | WEIGHT: 131 LBS | DIASTOLIC BLOOD PRESSURE: 97 MMHG | RESPIRATION RATE: 16 BRPM | HEART RATE: 66 BPM | SYSTOLIC BLOOD PRESSURE: 136 MMHG | HEIGHT: 64 IN | OXYGEN SATURATION: 98 %

## 2020-06-09 NOTE — ED PROVIDER NOTES
Subjective   Patient is brought to the emergency room by ambulance.  The initial charting says that he had back pain but he tells me that he had a seizure.  He said he has been drinking today and readily admits that.  He says he had one seizure on the way to the bathroom and then was out for about 15 minutes according to people around him and then got up to go to the bathroom again and had another seizure.  Right now he just complains of feeling bad all over because of the seizure.  He says he has a seizure because of his drinking but he also has seizures when he tries to stop drinking.        History provided by:  Patient and EMS personnel   used: No    Seizures   Seizure activity on arrival: no    Seizure type:  Grand mal  Preceding symptoms: no sensation of an aura present, no dizziness, no euphoria, no headache, no hyperventilation, no nausea, no numbness, no panic and no vision change    Initial focality:  None  Episode characteristics: abnormal movements and generalized shaking    Postictal symptoms: confusion    Return to baseline: yes    Severity:  Severe  Timing:  Clustered  Number of seizures this episode:  2  Progression:  Resolved  Context: drug use    Context: not alcohol withdrawal, not cerebral palsy, not change in medication, not sleeping less, not family hx of seizures, not fever, not intracranial lesion, not intracranial shunt, not possible hypoglycemia and not previous head injury    Recent head injury:  No recent head injuries  PTA treatment:  None  History of seizures: yes        Review of Systems   Constitutional: Negative.    HENT: Negative.    Respiratory: Negative.    Cardiovascular: Negative.    Gastrointestinal: Negative.    Genitourinary: Negative.    Musculoskeletal: Negative.    Skin: Negative.    Neurological: Positive for seizures.   Hematological: Negative.    Psychiatric/Behavioral: Negative.    All other systems reviewed and are negative.      Past Medical  "History:   Diagnosis Date   • Alcohol abuse    • Cancer (CMS/HCC)     \"GI\"   • Cirrhosis (CMS/HCC)    • Hypertension    • Seizures (CMS/HCC)        Allergies   Allergen Reactions   • Codeine GI Intolerance       Past Surgical History:   Procedure Laterality Date   • ANKLE SURGERY Left    • FEMUR SURGERY Left    • FOOT SURGERY Left        History reviewed. No pertinent family history.    Social History     Socioeconomic History   • Marital status: Single     Spouse name: Not on file   • Number of children: Not on file   • Years of education: Not on file   • Highest education level: Not on file   Tobacco Use   • Smoking status: Never Smoker   • Smokeless tobacco: Current User     Types: Snuff   Substance and Sexual Activity   • Alcohol use: Yes     Comment: \"pint a day\".   • Drug use: No   • Sexual activity: Defer       Prior to Admission medications    Medication Sig Start Date End Date Taking? Authorizing Provider   levETIRAcetam (KEPPRA) 500 MG tablet Take 500 mg by mouth 2 (Two) Times a Day.   Yes ProviderPatricia MD   lisinopril (PRINIVIL,ZESTRIL) 10 MG tablet Take 1 tablet by mouth Daily. 2/26/18  Yes Kyle Philip MD   raNITIdine (ZANTAC) 75 MG tablet Take 1 tablet by mouth Every Night. 2/26/18   Kyle Philip MD   SERTRALINE HCL PO Take  by mouth. Unknown dose    ProviderPatricia MD   sucralfate (CARAFATE) 1 g tablet Take 1 tablet by mouth 4 (Four) Times a Day. 2/26/18   Kyle Philip MD       Medications   sodium chloride 0.9 % flush 10 mL (has no administration in time range)   sodium chloride 0.9 % infusion (0 mL/hr Intravenous Stopped 6/9/20 0015)       Vitals:    06/09/20 0600   BP: 136/97   Pulse: 66   Resp: 16   Temp: 97.4 °F (36.3 °C)   SpO2: 98%         Objective   Physical Exam   Constitutional: He is oriented to person, place, and time. He appears well-developed and well-nourished.   Smells and ask of EtOH.   HENT:   Head: Normocephalic and atraumatic.   Eyes: " Pupils are equal, round, and reactive to light. EOM are normal.   Neck: Normal range of motion. Neck supple.   Cardiovascular: Normal rate and regular rhythm.   Pulmonary/Chest: Effort normal and breath sounds normal.   Abdominal: Soft. Bowel sounds are normal.   Musculoskeletal: Normal range of motion.   Neurological: He is alert and oriented to person, place, and time. He displays normal reflexes. No cranial nerve deficit or sensory deficit. He exhibits normal muscle tone. Coordination normal.   Skin: Skin is warm and dry.   Psychiatric: He has a normal mood and affect. His behavior is normal.   Nursing note and vitals reviewed.      Procedures         Lab Results (last 24 hours)     Procedure Component Value Units Date/Time    CBC & Differential [[id number]] Collected:  06/08/20 2013    Specimen:  Blood Updated:  06/08/20 2029    Narrative:       The following orders were created for panel order CBC & Differential.  Procedure                               Abnormality         Status                     ---------                               -----------         ------                     CBC Auto Differential[[id number]]        Abnormal            Final result                 Please view results for these tests on the individual orders.    Comprehensive Metabolic Panel [[id number]]  (Abnormal) Collected:  06/08/20 2013    Specimen:  Blood Updated:  06/08/20 2041     Glucose 86 mg/dL      BUN 8 mg/dL      Creatinine 0.55 mg/dL      Sodium 145 mmol/L      Potassium 3.8 mmol/L      Chloride 105 mmol/L      CO2 27.0 mmol/L      Calcium 8.0 mg/dL      Total Protein 6.4 g/dL      Albumin 3.90 g/dL      ALT (SGPT) 63 U/L      AST (SGOT) 78 U/L      Alkaline Phosphatase 57 U/L      Total Bilirubin 0.2 mg/dL      eGFR Non African Amer >150 mL/min/1.73      Globulin 2.5 gm/dL      A/G Ratio 1.6 g/dL      BUN/Creatinine Ratio 14.5     Anion Gap 13.0 mmol/L     Narrative:       GFR Normal >60  Chronic Kidney Disease <60  Kidney  Failure <15      Lipase [[id number]]  (Normal) Collected:  06/08/20 2013    Specimen:  Blood Updated:  06/08/20 2041     Lipase 24 U/L     Ethanol [[id number]] Collected:  06/08/20 2013    Specimen:  Blood Updated:  06/08/20 2041     Ethanol % 0.364 %     Narrative:       Not for legal purposes. Chain of Custody not followed.     Ammonia [[id number]]  (Normal) Collected:  06/08/20 2013    Specimen:  Blood Updated:  06/08/20 2043     Ammonia 29 umol/L     Protime-INR [[id number]]  (Abnormal) Collected:  06/08/20 2013    Specimen:  Blood Updated:  06/08/20 2034     Protime 15.1 Seconds      INR 1.23    aPTT [[id number]]  (Normal) Collected:  06/08/20 2013    Specimen:  Blood Updated:  06/08/20 2034     PTT 27.2 seconds     CBC Auto Differential [[id number]]  (Abnormal) Collected:  06/08/20 2013    Specimen:  Blood Updated:  06/08/20 2029     WBC 4.70 10*3/mm3      RBC 3.20 10*6/mm3      Hemoglobin 9.9 g/dL      Hematocrit 30.1 %      MCV 94.1 fL      MCH 30.9 pg      MCHC 32.9 g/dL      RDW 14.6 %      RDW-SD 50.5 fl      MPV 9.4 fL      Platelets 233 10*3/mm3     Manual Differential [[id number]]  (Abnormal) Collected:  06/08/20 2013    Specimen:  Blood Updated:  06/08/20 2049     Neutrophil % 51.5 %      Lymphocyte % 23.7 %      Monocyte % 16.5 %      Eosinophil % 5.2 %      Basophil % 2.1 %      Atypical Lymphocyte % 1.0 %      Neutrophils Absolute 2.42 10*3/mm3      Lymphocytes Absolute 1.11 10*3/mm3      Monocytes Absolute 0.78 10*3/mm3      Eosinophils Absolute 0.24 10*3/mm3      Basophils Absolute 0.10 10*3/mm3      Anisocytosis Slight/1+     Hypochromia Slight/1+     Microcytes Slight/1+     Polychromasia Slight/1+     WBC Morphology Normal     Giant Platelets Mod/2+          CT Head Without Contrast   Final Result   1. Atrophy.   2. No CT evidence of an acute intracranial process.                                  This report was finalized on 06/08/2020 20:46 by Dr. Curtis Munoz MD.          ED Course  ED Course as  "of Jun 09 0806 Mon Jun 08, 2020   2300 I left with Dr. Philip at shift change.    [TR]   e Jun 09, 2020   0151 Patient was endorsed to me at shift change. Please see primary providers full note for H and P.     Upon my evaluations he is sleeping soundly. He will need to sober up prior to any further evaluation. Vital signs are stable. I see the one 79% I think this was in error as every other reading is in the 90s and he is not on oxygen.     [JH]   0449 He is now awake and alert. He remarks to me \"I told those drivers to take me to [place] I have no way to get home from here. I told them to take me to [place]!!\"         [JH]   0506 Hes calling for a ride home but at this time he is clinically sober with normal mentation and speech. He has been observed her for nearly 10 hours. Will continue to observe until he has a ride.     [JH]      ED Course User Index  [JH] Kyle Philip MD  [TR] Jesus Kasper Jr., MD          MDM  Number of Diagnoses or Management Options  Alcoholic intoxication with complication (CMS/HCC): new and requires workup     Amount and/or Complexity of Data Reviewed  Clinical lab tests: ordered and reviewed  Tests in the radiology section of CPT®: ordered and reviewed    Risk of Complications, Morbidity, and/or Mortality  Presenting problems: moderate  Diagnostic procedures: moderate  Management options: moderate    Patient Progress  Patient progress: stable      Final diagnoses:   Alcoholic intoxication with complication (CMS/HCC)          Jesus Kasper Jr., MD  06/09/20 0806    "

## 2020-06-09 NOTE — DISCHARGE INSTRUCTIONS
Ge[place],    Your alcohol level was found to be over 364 today. This is very high. If you continue to drink in his manner you can suffer from liver failure and death.

## 2021-08-05 ENCOUNTER — APPOINTMENT (OUTPATIENT)
Dept: CT IMAGING | Age: 46
End: 2021-08-05
Payer: COMMERCIAL

## 2021-08-05 ENCOUNTER — HOSPITAL ENCOUNTER (EMERGENCY)
Age: 46
Discharge: HOME | End: 2021-08-05
Attending: EMERGENCY MEDICINE
Payer: COMMERCIAL

## 2021-08-05 VITALS
HEART RATE: 75 BPM | OXYGEN SATURATION: 98 % | SYSTOLIC BLOOD PRESSURE: 91 MMHG | DIASTOLIC BLOOD PRESSURE: 67 MMHG | RESPIRATION RATE: 16 BRPM

## 2021-08-05 DIAGNOSIS — S29.8XXA BLUNT TRAUMA TO CHEST, INITIAL ENCOUNTER: ICD-10-CM

## 2021-08-05 DIAGNOSIS — S27.321A CONTUSION OF RIGHT LUNG, INITIAL ENCOUNTER: ICD-10-CM

## 2021-08-05 DIAGNOSIS — F10.10 CHRONIC ALCOHOL ABUSE: ICD-10-CM

## 2021-08-05 DIAGNOSIS — R07.89 MUSCULOSKELETAL CHEST PAIN: Primary | ICD-10-CM

## 2021-08-05 DIAGNOSIS — S22.31XA CLOSED FRACTURE OF ONE RIB OF RIGHT SIDE, INITIAL ENCOUNTER: ICD-10-CM

## 2021-08-05 LAB
ALBUMIN SERPL-MCNC: 4.3 G/DL (ref 3.5–5.2)
ALP BLD-CCNC: 77 U/L (ref 40–130)
ALT SERPL-CCNC: 19 U/L (ref 5–41)
ANION GAP SERPL CALCULATED.3IONS-SCNC: 13 MMOL/L (ref 7–19)
AST SERPL-CCNC: 35 U/L (ref 5–40)
BASOPHILS ABSOLUTE: 0.1 K/UL (ref 0–0.2)
BASOPHILS RELATIVE PERCENT: 1.3 % (ref 0–1)
BILIRUB SERPL-MCNC: <0.2 MG/DL (ref 0.2–1.2)
BUN BLDV-MCNC: 14 MG/DL (ref 6–20)
CALCIUM SERPL-MCNC: 9.3 MG/DL (ref 8.6–10)
CHLORIDE BLD-SCNC: 101 MMOL/L (ref 98–111)
CO2: 25 MMOL/L (ref 22–29)
CREAT SERPL-MCNC: 0.8 MG/DL (ref 0.5–1.2)
EOSINOPHILS ABSOLUTE: 0.1 K/UL (ref 0–0.6)
EOSINOPHILS RELATIVE PERCENT: 1.9 % (ref 0–5)
GFR AFRICAN AMERICAN: >59
GFR NON-AFRICAN AMERICAN: >60
GLUCOSE BLD-MCNC: 96 MG/DL (ref 74–109)
HCT VFR BLD CALC: 28.4 % (ref 42–52)
HEMOGLOBIN: 9.6 G/DL (ref 14–18)
IMMATURE GRANULOCYTES #: 0 K/UL
LYMPHOCYTES ABSOLUTE: 1.5 K/UL (ref 1.1–4.5)
LYMPHOCYTES RELATIVE PERCENT: 24.2 % (ref 20–40)
MCH RBC QN AUTO: 31.3 PG (ref 27–31)
MCHC RBC AUTO-ENTMCNC: 33.8 G/DL (ref 33–37)
MCV RBC AUTO: 92.5 FL (ref 80–94)
MONOCYTES ABSOLUTE: 1.2 K/UL (ref 0–0.9)
MONOCYTES RELATIVE PERCENT: 18.6 % (ref 0–10)
NEUTROPHILS ABSOLUTE: 3.4 K/UL (ref 1.5–7.5)
NEUTROPHILS RELATIVE PERCENT: 53.7 % (ref 50–65)
PDW BLD-RTO: 19.2 % (ref 11.5–14.5)
PLATELET # BLD: 260 K/UL (ref 130–400)
PMV BLD AUTO: 9.4 FL (ref 9.4–12.4)
POTASSIUM REFLEX MAGNESIUM: 4.5 MMOL/L (ref 3.5–5)
RBC # BLD: 3.07 M/UL (ref 4.7–6.1)
SODIUM BLD-SCNC: 139 MMOL/L (ref 136–145)
TOTAL PROTEIN: 7.4 G/DL (ref 6.6–8.7)
TROPONIN: <0.01 NG/ML (ref 0–0.03)
WBC # BLD: 6.4 K/UL (ref 4.8–10.8)

## 2021-08-05 PROCEDURE — 71250 CT THORAX DX C-: CPT

## 2021-08-05 PROCEDURE — 85025 COMPLETE CBC W/AUTO DIFF WBC: CPT

## 2021-08-05 PROCEDURE — 93005 ELECTROCARDIOGRAM TRACING: CPT

## 2021-08-05 PROCEDURE — 96374 THER/PROPH/DIAG INJ IV PUSH: CPT

## 2021-08-05 PROCEDURE — 80053 COMPREHEN METABOLIC PANEL: CPT

## 2021-08-05 PROCEDURE — 6360000002 HC RX W HCPCS: Performed by: EMERGENCY MEDICINE

## 2021-08-05 PROCEDURE — 36415 COLL VENOUS BLD VENIPUNCTURE: CPT

## 2021-08-05 PROCEDURE — 84484 ASSAY OF TROPONIN QUANT: CPT

## 2021-08-05 PROCEDURE — 99282 EMERGENCY DEPT VISIT SF MDM: CPT

## 2021-08-05 RX ORDER — LIDOCAINE 50 MG/G
1 PATCH TOPICAL DAILY
Qty: 10 PATCH | Refills: 0 | Status: SHIPPED | OUTPATIENT
Start: 2021-08-05 | End: 2021-08-15

## 2021-08-05 RX ORDER — NAPROXEN 500 MG/1
500 TABLET ORAL 2 TIMES DAILY WITH MEALS
Qty: 20 TABLET | Refills: 0 | Status: SHIPPED | OUTPATIENT
Start: 2021-08-05 | End: 2021-08-15

## 2021-08-05 RX ORDER — KETOROLAC TROMETHAMINE 30 MG/ML
15 INJECTION, SOLUTION INTRAMUSCULAR; INTRAVENOUS ONCE
Status: COMPLETED | OUTPATIENT
Start: 2021-08-05 | End: 2021-08-05

## 2021-08-05 RX ADMIN — KETOROLAC TROMETHAMINE 15 MG: 30 INJECTION, SOLUTION INTRAMUSCULAR; INTRAVENOUS at 17:40

## 2021-08-05 ASSESSMENT — PAIN DESCRIPTION - DESCRIPTORS: DESCRIPTORS: PATIENT UNABLE TO DESCRIBE

## 2021-08-05 ASSESSMENT — ENCOUNTER SYMPTOMS
VOICE CHANGE: 0
RHINORRHEA: 0
EYE PAIN: 0
DIARRHEA: 0
VOMITING: 0
COUGH: 0
SHORTNESS OF BREATH: 0
ABDOMINAL PAIN: 0
EYE REDNESS: 0

## 2021-08-05 ASSESSMENT — PAIN DESCRIPTION - LOCATION: LOCATION: CHEST

## 2021-08-05 ASSESSMENT — PAIN SCALES - GENERAL
PAINLEVEL_OUTOF10: 9
PAINLEVEL_OUTOF10: 9

## 2021-08-05 NOTE — ED NOTES
Bed: 03  Expected date:   Expected time:   Means of arrival:   Comments:  Terminal     Marielle Mitchell RN  08/05/21 1306

## 2021-08-05 NOTE — ED NOTES
Patient will not have a ride home until 1930, patient will wait in [facility] until then       Katiuska Messina RN  08/05/21 2982

## 2021-08-05 NOTE — ED NOTES
Attempted to get the patient a ride home, called the only contact in the chart and the number has been disconnected. PER [facility], 'Junior' would be able to come get the patient after 1930.       Relda Dancer, RN  08/05/21 7770
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
## 2021-08-09 LAB
EKG P AXIS: 72 DEGREES
EKG P-R INTERVAL: 166 MS
EKG Q-T INTERVAL: 344 MS
EKG QRS DURATION: 86 MS
EKG QTC CALCULATION (BAZETT): 419 MS
EKG T AXIS: 60 DEGREES

## 2021-10-30 ENCOUNTER — HOSPITAL ENCOUNTER (INPATIENT)
Age: 46
LOS: 5 days | Discharge: HOME | End: 2021-11-04
Attending: INTERNAL MEDICINE | Admitting: INTERNAL MEDICINE
Payer: COMMERCIAL

## 2021-10-30 PROCEDURE — HZ2ZZZZ DETOXIFICATION SERVICES FOR SUBSTANCE ABUSE TREATMENT: ICD-10-PCS | Performed by: INTERNAL MEDICINE

## 2021-10-30 PROCEDURE — 2000000000 HC ICU R&B

## 2021-10-30 RX ORDER — LORAZEPAM 2 MG/ML
3 INJECTION INTRAMUSCULAR
Status: DISCONTINUED | OUTPATIENT
Start: 2021-10-30 | End: 2021-11-02

## 2021-10-30 RX ORDER — LORAZEPAM 1 MG/1
1 TABLET ORAL
Status: DISCONTINUED | OUTPATIENT
Start: 2021-10-30 | End: 2021-11-02

## 2021-10-30 RX ORDER — LORAZEPAM 2 MG/ML
1 INJECTION INTRAMUSCULAR
Status: DISCONTINUED | OUTPATIENT
Start: 2021-10-30 | End: 2021-11-02

## 2021-10-30 RX ORDER — LORAZEPAM 1 MG/1
4 TABLET ORAL
Status: DISCONTINUED | OUTPATIENT
Start: 2021-10-30 | End: 2021-11-02

## 2021-10-30 RX ORDER — LORAZEPAM 1 MG/1
2 TABLET ORAL
Status: DISCONTINUED | OUTPATIENT
Start: 2021-10-30 | End: 2021-11-02

## 2021-10-30 RX ORDER — LORAZEPAM 2 MG/ML
4 INJECTION INTRAMUSCULAR
Status: DISCONTINUED | OUTPATIENT
Start: 2021-10-30 | End: 2021-11-02

## 2021-10-30 RX ORDER — LORAZEPAM 2 MG/ML
2 INJECTION INTRAMUSCULAR
Status: DISCONTINUED | OUTPATIENT
Start: 2021-10-30 | End: 2021-11-02

## 2021-10-30 RX ORDER — LORAZEPAM 1 MG/1
3 TABLET ORAL
Status: DISCONTINUED | OUTPATIENT
Start: 2021-10-30 | End: 2021-11-02

## 2021-10-31 PROBLEM — G40.909 SEIZURE DISORDER (HCC): Status: ACTIVE | Noted: 2021-10-31

## 2021-10-31 PROBLEM — R63.4 WEIGHT LOSS: Status: ACTIVE | Noted: 2021-10-31

## 2021-10-31 PROBLEM — J44.9 COPD (CHRONIC OBSTRUCTIVE PULMONARY DISEASE) (HCC): Status: ACTIVE | Noted: 2021-10-31

## 2021-10-31 PROBLEM — R41.0 CONFUSION: Status: ACTIVE | Noted: 2021-10-31

## 2021-10-31 PROBLEM — F10.121 ACUTE ALCOHOL INTOXICATION DELIRIUM WITH MILD USE DISORDER (HCC): Status: ACTIVE | Noted: 2021-10-31

## 2021-10-31 PROBLEM — R63.0 ANOREXIA: Status: ACTIVE | Noted: 2021-10-31

## 2021-10-31 PROBLEM — N18.9 CKD (CHRONIC KIDNEY DISEASE): Status: ACTIVE | Noted: 2021-10-31

## 2021-10-31 PROBLEM — E86.0 DEHYDRATION: Status: ACTIVE | Noted: 2021-10-31

## 2021-10-31 PROBLEM — E87.8 ELECTROLYTE ABNORMALITY: Status: ACTIVE | Noted: 2021-10-31

## 2021-10-31 PROBLEM — Z72.0 TOBACCO ABUSE: Status: ACTIVE | Noted: 2021-10-31

## 2021-10-31 LAB
ALBUMIN SERPL-MCNC: 4 G/DL (ref 3.5–5.2)
ALBUMIN SERPL-MCNC: 4.5 G/DL (ref 3.5–5.2)
ALP BLD-CCNC: 58 U/L (ref 40–130)
ALP BLD-CCNC: 59 U/L (ref 40–130)
ALT SERPL-CCNC: 26 U/L (ref 5–41)
ALT SERPL-CCNC: 28 U/L (ref 5–41)
AMPHETAMINE SCREEN, URINE: NEGATIVE
ANION GAP SERPL CALCULATED.3IONS-SCNC: 12 MMOL/L (ref 7–19)
ANION GAP SERPL CALCULATED.3IONS-SCNC: 14 MMOL/L (ref 7–19)
AST SERPL-CCNC: 32 U/L (ref 5–40)
AST SERPL-CCNC: 37 U/L (ref 5–40)
BARBITURATE SCREEN URINE: NEGATIVE
BASOPHILS ABSOLUTE: 0.1 K/UL (ref 0–0.2)
BASOPHILS ABSOLUTE: 0.1 K/UL (ref 0–0.2)
BASOPHILS RELATIVE PERCENT: 0.4 % (ref 0–1)
BASOPHILS RELATIVE PERCENT: 0.8 % (ref 0–1)
BENZODIAZEPINE SCREEN, URINE: NEGATIVE
BILIRUB SERPL-MCNC: 0.8 MG/DL (ref 0.2–1.2)
BILIRUB SERPL-MCNC: 1.3 MG/DL (ref 0.2–1.2)
BILIRUBIN DIRECT: 0.4 MG/DL (ref 0–0.3)
BILIRUBIN, INDIRECT: 0.9 MG/DL (ref 0.1–1)
BUN BLDV-MCNC: 5 MG/DL (ref 6–20)
BUN BLDV-MCNC: 7 MG/DL (ref 6–20)
CALCIUM SERPL-MCNC: 8.8 MG/DL (ref 8.6–10)
CALCIUM SERPL-MCNC: 9.1 MG/DL (ref 8.6–10)
CANNABINOID SCREEN URINE: NEGATIVE
CHLORIDE BLD-SCNC: 88 MMOL/L (ref 98–111)
CHLORIDE BLD-SCNC: 92 MMOL/L (ref 98–111)
CO2: 25 MMOL/L (ref 22–29)
CO2: 29 MMOL/L (ref 22–29)
COCAINE METABOLITE SCREEN URINE: NEGATIVE
CREAT SERPL-MCNC: 0.5 MG/DL (ref 0.5–1.2)
CREAT SERPL-MCNC: 0.6 MG/DL (ref 0.5–1.2)
EOSINOPHILS ABSOLUTE: 0.1 K/UL (ref 0–0.6)
EOSINOPHILS ABSOLUTE: 0.1 K/UL (ref 0–0.6)
EOSINOPHILS RELATIVE PERCENT: 0.8 % (ref 0–5)
EOSINOPHILS RELATIVE PERCENT: 1.3 % (ref 0–5)
ETHANOL: <10 MG/DL (ref 0–0.08)
GFR AFRICAN AMERICAN: >59
GFR AFRICAN AMERICAN: >59
GFR NON-AFRICAN AMERICAN: >60
GFR NON-AFRICAN AMERICAN: >60
GLUCOSE BLD-MCNC: 104 MG/DL (ref 74–109)
GLUCOSE BLD-MCNC: 88 MG/DL (ref 74–109)
HCT VFR BLD CALC: 32.1 % (ref 42–52)
HCT VFR BLD CALC: 32.4 % (ref 42–52)
HEMOGLOBIN: 10.6 G/DL (ref 14–18)
HEMOGLOBIN: 10.7 G/DL (ref 14–18)
IMMATURE GRANULOCYTES #: 0 K/UL
IMMATURE GRANULOCYTES #: 0 K/UL
LYMPHOCYTES ABSOLUTE: 0.9 K/UL (ref 1.1–4.5)
LYMPHOCYTES ABSOLUTE: 1 K/UL (ref 1.1–4.5)
LYMPHOCYTES RELATIVE PERCENT: 12.3 % (ref 20–40)
LYMPHOCYTES RELATIVE PERCENT: 7.5 % (ref 20–40)
Lab: NORMAL
MAGNESIUM: 1.4 MG/DL (ref 1.6–2.6)
MAGNESIUM: 1.7 MG/DL (ref 1.6–2.6)
MCH RBC QN AUTO: 30.2 PG (ref 27–31)
MCH RBC QN AUTO: 30.4 PG (ref 27–31)
MCHC RBC AUTO-ENTMCNC: 33 G/DL (ref 33–37)
MCHC RBC AUTO-ENTMCNC: 33 G/DL (ref 33–37)
MCV RBC AUTO: 91.5 FL (ref 80–94)
MCV RBC AUTO: 92 FL (ref 80–94)
MONOCYTES ABSOLUTE: 1.4 K/UL (ref 0–0.9)
MONOCYTES ABSOLUTE: 2.1 K/UL (ref 0–0.9)
MONOCYTES RELATIVE PERCENT: 17.5 % (ref 0–10)
MONOCYTES RELATIVE PERCENT: 17.8 % (ref 0–10)
NEUTROPHILS ABSOLUTE: 5.2 K/UL (ref 1.5–7.5)
NEUTROPHILS ABSOLUTE: 8.7 K/UL (ref 1.5–7.5)
NEUTROPHILS RELATIVE PERCENT: 67.5 % (ref 50–65)
NEUTROPHILS RELATIVE PERCENT: 73.5 % (ref 50–65)
OPIATE SCREEN URINE: NEGATIVE
PDW BLD-RTO: 13.7 % (ref 11.5–14.5)
PDW BLD-RTO: 13.9 % (ref 11.5–14.5)
PLATELET # BLD: 154 K/UL (ref 130–400)
PLATELET # BLD: 173 K/UL (ref 130–400)
PMV BLD AUTO: 9.1 FL (ref 9.4–12.4)
PMV BLD AUTO: 9.8 FL (ref 9.4–12.4)
POTASSIUM REFLEX MAGNESIUM: 3 MMOL/L (ref 3.5–5)
POTASSIUM REFLEX MAGNESIUM: 3.1 MMOL/L (ref 3.5–5)
RBC # BLD: 3.49 M/UL (ref 4.7–6.1)
RBC # BLD: 3.54 M/UL (ref 4.7–6.1)
SODIUM BLD-SCNC: 129 MMOL/L (ref 136–145)
SODIUM BLD-SCNC: 131 MMOL/L (ref 136–145)
TOTAL PROTEIN: 6.6 G/DL (ref 6.6–8.7)
TOTAL PROTEIN: 7 G/DL (ref 6.6–8.7)
TSH SERPL DL<=0.05 MIU/L-ACNC: 2.44 UIU/ML (ref 0.27–4.2)
WBC # BLD: 11.9 K/UL (ref 4.8–10.8)
WBC # BLD: 7.7 K/UL (ref 4.8–10.8)

## 2021-10-31 PROCEDURE — 83735 ASSAY OF MAGNESIUM: CPT

## 2021-10-31 PROCEDURE — 80053 COMPREHEN METABOLIC PANEL: CPT

## 2021-10-31 PROCEDURE — 84443 ASSAY THYROID STIM HORMONE: CPT

## 2021-10-31 PROCEDURE — 93005 ELECTROCARDIOGRAM TRACING: CPT | Performed by: INTERNAL MEDICINE

## 2021-10-31 PROCEDURE — 6370000000 HC RX 637 (ALT 250 FOR IP): Performed by: INTERNAL MEDICINE

## 2021-10-31 PROCEDURE — 2000000000 HC ICU R&B

## 2021-10-31 PROCEDURE — 87086 URINE CULTURE/COLONY COUNT: CPT

## 2021-10-31 PROCEDURE — 6360000002 HC RX W HCPCS: Performed by: INTERNAL MEDICINE

## 2021-10-31 PROCEDURE — 2580000003 HC RX 258: Performed by: INTERNAL MEDICINE

## 2021-10-31 PROCEDURE — 36415 COLL VENOUS BLD VENIPUNCTURE: CPT

## 2021-10-31 PROCEDURE — P9047 ALBUMIN (HUMAN), 25%, 50ML: HCPCS | Performed by: INTERNAL MEDICINE

## 2021-10-31 PROCEDURE — 82077 ASSAY SPEC XCP UR&BREATH IA: CPT

## 2021-10-31 PROCEDURE — 87040 BLOOD CULTURE FOR BACTERIA: CPT

## 2021-10-31 PROCEDURE — 80307 DRUG TEST PRSMV CHEM ANLYZR: CPT

## 2021-10-31 PROCEDURE — 2700000000 HC OXYGEN THERAPY PER DAY

## 2021-10-31 PROCEDURE — 85025 COMPLETE CBC W/AUTO DIFF WBC: CPT

## 2021-10-31 PROCEDURE — 2500000003 HC RX 250 WO HCPCS: Performed by: INTERNAL MEDICINE

## 2021-10-31 RX ORDER — MAGNESIUM SULFATE IN WATER 40 MG/ML
4000 INJECTION, SOLUTION INTRAVENOUS ONCE
Status: COMPLETED | OUTPATIENT
Start: 2021-10-31 | End: 2021-10-31

## 2021-10-31 RX ORDER — NICOTINE 21 MG/24HR
1 PATCH, TRANSDERMAL 24 HOURS TRANSDERMAL DAILY
Status: DISCONTINUED | OUTPATIENT
Start: 2021-10-31 | End: 2021-11-04 | Stop reason: HOSPADM

## 2021-10-31 RX ORDER — LORAZEPAM 1 MG/1
2 TABLET ORAL
Status: DISCONTINUED | OUTPATIENT
Start: 2021-10-31 | End: 2021-10-31 | Stop reason: SDUPTHER

## 2021-10-31 RX ORDER — POTASSIUM CHLORIDE 7.45 MG/ML
10 INJECTION INTRAVENOUS PRN
Status: DISCONTINUED | OUTPATIENT
Start: 2021-10-31 | End: 2021-11-04 | Stop reason: HOSPADM

## 2021-10-31 RX ORDER — ONDANSETRON 2 MG/ML
4 INJECTION INTRAMUSCULAR; INTRAVENOUS EVERY 6 HOURS PRN
Status: DISCONTINUED | OUTPATIENT
Start: 2021-10-31 | End: 2021-11-04 | Stop reason: HOSPADM

## 2021-10-31 RX ORDER — FUROSEMIDE 10 MG/ML
20 INJECTION INTRAMUSCULAR; INTRAVENOUS ONCE
Status: COMPLETED | OUTPATIENT
Start: 2021-10-31 | End: 2021-10-31

## 2021-10-31 RX ORDER — SODIUM CHLORIDE 0.9 % (FLUSH) 0.9 %
5-40 SYRINGE (ML) INJECTION EVERY 12 HOURS SCHEDULED
Status: DISCONTINUED | OUTPATIENT
Start: 2021-10-31 | End: 2021-11-04 | Stop reason: HOSPADM

## 2021-10-31 RX ORDER — LORAZEPAM 1 MG/1
4 TABLET ORAL
Status: DISCONTINUED | OUTPATIENT
Start: 2021-10-31 | End: 2021-10-31 | Stop reason: SDUPTHER

## 2021-10-31 RX ORDER — LORAZEPAM 1 MG/1
3 TABLET ORAL
Status: DISCONTINUED | OUTPATIENT
Start: 2021-10-31 | End: 2021-10-31 | Stop reason: SDUPTHER

## 2021-10-31 RX ORDER — POTASSIUM CHLORIDE 7.45 MG/ML
10 INJECTION INTRAVENOUS
Status: COMPLETED | OUTPATIENT
Start: 2021-10-31 | End: 2021-11-01

## 2021-10-31 RX ORDER — LORAZEPAM 2 MG/ML
2 INJECTION INTRAMUSCULAR
Status: DISCONTINUED | OUTPATIENT
Start: 2021-10-31 | End: 2021-10-31 | Stop reason: SDUPTHER

## 2021-10-31 RX ORDER — LORAZEPAM 2 MG/ML
1 INJECTION INTRAMUSCULAR
Status: DISCONTINUED | OUTPATIENT
Start: 2021-10-31 | End: 2021-10-31 | Stop reason: SDUPTHER

## 2021-10-31 RX ORDER — LORAZEPAM 2 MG/ML
3 INJECTION INTRAMUSCULAR
Status: DISCONTINUED | OUTPATIENT
Start: 2021-10-31 | End: 2021-10-31 | Stop reason: SDUPTHER

## 2021-10-31 RX ORDER — ALBUMIN (HUMAN) 12.5 G/50ML
25 SOLUTION INTRAVENOUS ONCE
Status: COMPLETED | OUTPATIENT
Start: 2021-10-31 | End: 2021-10-31

## 2021-10-31 RX ORDER — ACETAMINOPHEN 650 MG/1
650 SUPPOSITORY RECTAL EVERY 6 HOURS PRN
Status: DISCONTINUED | OUTPATIENT
Start: 2021-10-31 | End: 2021-11-04 | Stop reason: HOSPADM

## 2021-10-31 RX ORDER — SODIUM CHLORIDE 0.9 % (FLUSH) 0.9 %
5-40 SYRINGE (ML) INJECTION PRN
Status: DISCONTINUED | OUTPATIENT
Start: 2021-10-31 | End: 2021-11-04 | Stop reason: HOSPADM

## 2021-10-31 RX ORDER — SODIUM CHLORIDE 9 MG/ML
25 INJECTION, SOLUTION INTRAVENOUS PRN
Status: DISCONTINUED | OUTPATIENT
Start: 2021-10-31 | End: 2021-11-04 | Stop reason: HOSPADM

## 2021-10-31 RX ORDER — ACETAMINOPHEN 325 MG/1
650 TABLET ORAL EVERY 6 HOURS PRN
Status: DISCONTINUED | OUTPATIENT
Start: 2021-10-31 | End: 2021-11-04 | Stop reason: HOSPADM

## 2021-10-31 RX ORDER — POTASSIUM CHLORIDE 7.45 MG/ML
10 INJECTION INTRAVENOUS PRN
Status: DISCONTINUED | OUTPATIENT
Start: 2021-10-31 | End: 2021-11-02 | Stop reason: SDUPTHER

## 2021-10-31 RX ORDER — LORAZEPAM 2 MG/ML
4 INJECTION INTRAMUSCULAR
Status: DISCONTINUED | OUTPATIENT
Start: 2021-10-31 | End: 2021-10-31 | Stop reason: SDUPTHER

## 2021-10-31 RX ORDER — MAGNESIUM SULFATE IN WATER 40 MG/ML
2000 INJECTION, SOLUTION INTRAVENOUS PRN
Status: DISCONTINUED | OUTPATIENT
Start: 2021-10-31 | End: 2021-11-04 | Stop reason: HOSPADM

## 2021-10-31 RX ORDER — ONDANSETRON 4 MG/1
4 TABLET, ORALLY DISINTEGRATING ORAL EVERY 8 HOURS PRN
Status: DISCONTINUED | OUTPATIENT
Start: 2021-10-31 | End: 2021-11-04 | Stop reason: HOSPADM

## 2021-10-31 RX ORDER — POTASSIUM CHLORIDE 20 MEQ/1
40 TABLET, EXTENDED RELEASE ORAL PRN
Status: DISCONTINUED | OUTPATIENT
Start: 2021-10-31 | End: 2021-11-04 | Stop reason: HOSPADM

## 2021-10-31 RX ORDER — THIAMINE HYDROCHLORIDE 100 MG/ML
100 INJECTION, SOLUTION INTRAMUSCULAR; INTRAVENOUS DAILY
Status: DISCONTINUED | OUTPATIENT
Start: 2021-10-31 | End: 2021-11-02

## 2021-10-31 RX ORDER — POLYETHYLENE GLYCOL 3350 17 G/17G
17 POWDER, FOR SOLUTION ORAL DAILY PRN
Status: DISCONTINUED | OUTPATIENT
Start: 2021-10-31 | End: 2021-11-04 | Stop reason: HOSPADM

## 2021-10-31 RX ORDER — MAGNESIUM SULFATE 1 G/100ML
1000 INJECTION INTRAVENOUS PRN
Status: DISCONTINUED | OUTPATIENT
Start: 2021-10-31 | End: 2021-11-04 | Stop reason: HOSPADM

## 2021-10-31 RX ORDER — LORAZEPAM 1 MG/1
1 TABLET ORAL
Status: DISCONTINUED | OUTPATIENT
Start: 2021-10-31 | End: 2021-10-31 | Stop reason: SDUPTHER

## 2021-10-31 RX ADMIN — POTASSIUM CHLORIDE 10 MEQ: 10 INJECTION, SOLUTION INTRAVENOUS at 10:30

## 2021-10-31 RX ADMIN — POTASSIUM CHLORIDE 10 MEQ: 10 INJECTION, SOLUTION INTRAVENOUS at 05:10

## 2021-10-31 RX ADMIN — ALBUMIN (HUMAN) 25 G: 0.25 INJECTION, SOLUTION INTRAVENOUS at 01:52

## 2021-10-31 RX ADMIN — LORAZEPAM 2 MG: 2 INJECTION INTRAMUSCULAR; INTRAVENOUS at 11:21

## 2021-10-31 RX ADMIN — FAMOTIDINE 20 MG: 10 INJECTION, SOLUTION INTRAVENOUS at 19:41

## 2021-10-31 RX ADMIN — THIAMINE HYDROCHLORIDE 100 MG: 100 INJECTION, SOLUTION INTRAMUSCULAR; INTRAVENOUS at 09:17

## 2021-10-31 RX ADMIN — LORAZEPAM 2 MG: 2 INJECTION INTRAMUSCULAR; INTRAVENOUS at 00:13

## 2021-10-31 RX ADMIN — FAMOTIDINE 20 MG: 10 INJECTION, SOLUTION INTRAVENOUS at 04:36

## 2021-10-31 RX ADMIN — FAMOTIDINE 20 MG: 10 INJECTION, SOLUTION INTRAVENOUS at 09:34

## 2021-10-31 RX ADMIN — POTASSIUM CHLORIDE 10 MEQ: 10 INJECTION, SOLUTION INTRAVENOUS at 04:47

## 2021-10-31 RX ADMIN — ENOXAPARIN SODIUM 40 MG: 100 INJECTION SUBCUTANEOUS at 09:17

## 2021-10-31 RX ADMIN — PIPERACILLIN AND TAZOBACTAM 3375 MG: 3; .375 INJECTION, POWDER, LYOPHILIZED, FOR SOLUTION INTRAVENOUS at 04:46

## 2021-10-31 RX ADMIN — POTASSIUM CHLORIDE 10 MEQ: 10 INJECTION, SOLUTION INTRAVENOUS at 02:58

## 2021-10-31 RX ADMIN — PIPERACILLIN AND TAZOBACTAM 3375 MG: 3; .375 INJECTION, POWDER, LYOPHILIZED, FOR SOLUTION INTRAVENOUS at 15:00

## 2021-10-31 RX ADMIN — POTASSIUM CHLORIDE 10 MEQ: 10 INJECTION, SOLUTION INTRAVENOUS at 01:50

## 2021-10-31 RX ADMIN — LORAZEPAM 2 MG: 2 INJECTION INTRAMUSCULAR; INTRAVENOUS at 19:48

## 2021-10-31 RX ADMIN — Medication 10 ML: at 19:49

## 2021-10-31 RX ADMIN — POTASSIUM CHLORIDE 10 MEQ: 10 INJECTION, SOLUTION INTRAVENOUS at 04:01

## 2021-10-31 RX ADMIN — PIPERACILLIN AND TAZOBACTAM 3375 MG: 3; .375 INJECTION, POWDER, LYOPHILIZED, FOR SOLUTION INTRAVENOUS at 09:00

## 2021-10-31 RX ADMIN — POTASSIUM CHLORIDE 10 MEQ: 10 INJECTION, SOLUTION INTRAVENOUS at 09:33

## 2021-10-31 RX ADMIN — LORAZEPAM 2 MG: 2 INJECTION INTRAMUSCULAR; INTRAVENOUS at 23:05

## 2021-10-31 RX ADMIN — Medication 10 ML: at 09:52

## 2021-10-31 RX ADMIN — FOLIC ACID: 5 INJECTION, SOLUTION INTRAMUSCULAR; INTRAVENOUS; SUBCUTANEOUS at 01:16

## 2021-10-31 RX ADMIN — MAGNESIUM SULFATE HEPTAHYDRATE 4000 MG: 40 INJECTION, SOLUTION INTRAVENOUS at 10:04

## 2021-10-31 RX ADMIN — FUROSEMIDE 20 MG: 10 INJECTION, SOLUTION INTRAMUSCULAR; INTRAVENOUS at 01:53

## 2021-10-31 ASSESSMENT — PAIN SCALES - GENERAL: PAINLEVEL_OUTOF10: 0

## 2021-10-31 ASSESSMENT — ENCOUNTER SYMPTOMS
ABDOMINAL DISTENTION: 1
EYES NEGATIVE: 1
SHORTNESS OF BREATH: 1

## 2021-10-31 NOTE — H&P
HISTORY AND PHYSICAL             Date: 10/31/2021        Patient Name: Terry Zambrano     YOB: 1975      Age:  50 y.o. Chief Complaint      Patient transferred from outside hospital   He is at [facility] ER about 3 to 4 times per week. He is there due to acute alcohol intoxication and he is stabilized and improved and then gets discharged. This time, while in er more lethargic. He had not had etoh intake for about 2 to 3 days and was in full blown withdrawal and had an etoh related seizure. No fever, no chills. Some muscle aches   occ  Myopathy   Weakness. Anorexia   Weight loss     History Obtained From   ER chart. History of Present Illness   Patient is a 50year old male who has a long standing history of alcohol abuse, and drug abuse. Comes in for repeat er visits. All of a sudden today, noted to have stopped drinking fluids for some days. He was agitated   Did not feel like himself   Pain all over   Weakness and difficulty in ambulating and participating in activities. No fever, no chils. Mental obtundation           Past Medical History     Past Medical History:   Diagnosis Date    [facility] [address]     Diabetes mellitus ([facility] [address]     ETOH abuse     GERD (gastroesophageal reflux disease)     Heart murmur     Hypertension     Hypertension     Pancreatitis     Stomach cancer [facility])         Past Surgical History   No past surgical history on file. Medications Prior to Admission     Prior to Admission medications    Medication Sig Start Date End Date Taking?  Authorizing Provider   naproxen (NAPROSYN) 500 MG tablet Take 1 tablet by mouth 2 times daily (with meals) for 10 days 8/5/21 8/15/21  Gloria Moulton MD   SERTRALINE HCL PO Take by mouth    Historical Provider, MD   lisinopril (PRINIVIL;ZESTRIL) 10 MG tablet Take 10 mg by mouth 2/26/18   Historical Provider, MD   Thiamine HCl (B-1 PO) Take 100 mg by mouth    Historical Provider, MD   gabapentin (NEURONTIN) 300 MG capsule Take 300 mg by mouth. . 10/12/18   Historical Provider, MD   sucralfate (CARAFATE) 1 GM tablet Take 1 g by mouth    Historical Provider, MD   chlordiazePOXIDE (LIBRIUM) 25 MG capsule Take 25 mg by mouth. .    Historical Provider, MD   senna (SENOKOT) 8.6 MG tablet Take 1 tablet by mouth daily    Historical Provider, MD        Allergies   Codeine and Other    Social History     Social History     Tobacco History     Smoking Status  Current Every Day Smoker Smoking Frequency  For 35 years Smoking Tobacco Type  Cigarettes    Smokeless Tobacco Use  Current User Smokeless Tobacco Type  Snuff, Chew          Alcohol History     Alcohol Use Status  Not Currently Drinks/Week  3 Shots of liquor per week Amount  3.0 standard drinks of alcohol/wk Comment  3 or more drinks a day          Drug Use     Drug Use Status  Yes Types  Marijuana          Sexual Activity     Sexually Active  Not Asked                Family History     Family History   Problem Relation Age of Onset    Cancer Mother     Heart Failure Mother     COPD Mother     Heart Disease Father     Lupus Father     Alcohol Abuse Father        Review of Systems   Review of Systems   Constitutional: Positive for activity change and appetite change. HENT: Negative. Eyes: Negative. Respiratory: Positive for shortness of breath. Cardiovascular: Positive for chest pain, palpitations and leg swelling. Gastrointestinal: Positive for abdominal distention. Genitourinary: Positive for difficulty urinating and frequency. Allergic/Immunologic: Positive for environmental allergies. Neurological: Positive for tremors, facial asymmetry, light-headedness and headaches. All other systems reviewed and are negative. Physical Exam   BP (!) 152/96   Pulse 87   Temp 97.6 °F (36.4 °C) (Temporal)   Resp 14   Ht 5' 6\" (1.676 m)   Wt 101 lb 4.8 oz (45.9 kg)   SpO2 98%   BMI 16.35 kg/m²     Physical Exam  Vitals and nursing note reviewed. Constitutional:       General: He is in acute distress. Appearance: He is ill-appearing. HENT:      Head: Normocephalic and atraumatic. Nose: Nose normal.   Eyes:      Conjunctiva/sclera: Conjunctivae normal.   Cardiovascular:      Rate and Rhythm: Regular rhythm. Tachycardia present. Pulses: Normal pulses. Heart sounds: Normal heart sounds. Pulmonary:      Effort: Pulmonary effort is normal.      Breath sounds: Normal breath sounds. Abdominal:      General: Abdomen is flat. Bowel sounds are normal.      Palpations: Abdomen is soft. Musculoskeletal:         General: Normal range of motion. Cervical back: Normal range of motion. Skin:     General: Skin is warm. Neurological:      General: No focal deficit present.    Psychiatric:         Mood and Affect: Mood normal.         Labs      Recent Results (from the past 24 hour(s))   CBC Auto Differential    Collection Time: 10/31/21 12:10 AM   Result Value Ref Range    WBC 7.7 4.8 - 10.8 K/uL    RBC 3.54 (L) 4.70 - 6.10 M/uL    Hemoglobin 10.7 (L) 14.0 - 18.0 g/dL    Hematocrit 32.4 (L) 42.0 - 52.0 %    MCV 91.5 80.0 - 94.0 fL    MCH 30.2 27.0 - 31.0 pg    MCHC 33.0 33.0 - 37.0 g/dL    RDW 13.7 11.5 - 14.5 %    Platelets 427 231 - 991 K/uL    MPV 9.8 9.4 - 12.4 fL    Neutrophils % 67.5 (H) 50.0 - 65.0 %    Lymphocytes % 12.3 (L) 20.0 - 40.0 %    Monocytes % 17.8 (H) 0.0 - 10.0 %    Eosinophils % 1.3 0.0 - 5.0 %    Basophils % 0.8 0.0 - 1.0 %    Neutrophils Absolute 5.2 1.5 - 7.5 K/uL    Immature Granulocytes # 0.0 K/uL    Lymphocytes Absolute 1.0 (L) 1.1 - 4.5 K/uL    Monocytes Absolute 1.40 (H) 0.00 - 0.90 K/uL    Eosinophils Absolute 0.10 0.00 - 0.60 K/uL    Basophils Absolute 0.10 0.00 - 0.20 K/uL   Comprehensive Metabolic Panel w/ Reflex to MG    Collection Time: 10/31/21 12:10 AM   Result Value Ref Range    Sodium 131 (L) 136 - 145 mmol/L    Potassium reflex Magnesium 3.0 (L) 3.5 - 5.0 mmol/L    Chloride 92 (L) 98 - 111 mmol/L CO2 25 22 - 29 mmol/L    Anion Gap 14 7 - 19 mmol/L    Glucose 88 74 - 109 mg/dL    BUN 7 6 - 20 mg/dL    CREATININE 0.5 0.5 - 1.2 mg/dL    GFR Non-African American >60 >60    GFR African American >59 >59    Calcium 8.8 8.6 - 10.0 mg/dL    Total Protein 6.6 6.6 - 8.7 g/dL    Albumin 4.0 3.5 - 5.2 g/dL    Total Bilirubin 0.8 0.2 - 1.2 mg/dL    Alkaline Phosphatase 59 40 - 130 U/L    ALT 28 5 - 41 U/L    AST 37 5 - 40 U/L   Blood alcohol level    Collection Time: 10/31/21 12:10 AM   Result Value Ref Range    Ethanol Lvl <10 mg/dL   Magnesium    Collection Time: 10/31/21 12:10 AM   Result Value Ref Range    Magnesium 1.7 1.6 - 2.6 mg/dL        Imaging/Diagnostics Last 24 Hours   No results found. Assessment      Hospital Problems         Last Modified POA    * (Principal) Acute alcohol intoxication delirium with mild use disorder ([facility] [address] 10/31/2021 Yes    Seizure disorder ([address] 10/31/2021 Yes    Confusion 10/31/2021 Yes    Anorexia 10/31/2021 Yes    Weight loss 10/31/2021 Yes    Dehydration 10/31/2021 Yes    Electrolyte abnormality 10/31/2021 Yes    CKD (chronic kidney disease) 10/31/2021 Yes    Tobacco abuse 10/31/2021 Yes    COPD (chronic obstructive pulmonary disease) ([facility] [address] 10/31/2021 Yes          Plan   3. 51 yo male with multiple medical issues, and recurrent acute alcohol intoxication and this time  Apparently, had a alcohol withdrawal seizure. He then became increasingly more somnolent. He was being monitored at [facility] ER carefully, when he experienced another seizure. 2.  They called to transfer patient to us, and he did appear, chronically and critically ill. 3.  Poor oral intake. 4.  dvt and pe set up for patient with lovenox when more stable. 5.  Replace fluids and electrolytes     6. Ativan for etoh withdrawal.     7.  Gi and lovenox prophylaxis. Monitor for improvement and overallstabilize and then hopefully slowly wake up when more stable.      8.  No surgical intervntion

## 2021-10-31 NOTE — CONSULTS
**Physician Signature**  This document was electronically signed by: Lauren Mcmillan DO  10/31/2021 11:35   AM    **Consult Information**  Member Facility: [address] [place] [address] MRN: [id number]  Visit/Encounter Number: [id number]  Consult ID: [id number]  Facility Time Zone: CT  Date and Time of Request: 10/31/2021 10:26 AM  CT  Requesting Clinician: Stewart Vuong MD  Patient Name: Jairon Cordova  YOB: 1975  Gender: Male  Patient identity was confirmed at the beginning of the consult with the   patient/family/staff using two personal identifiers: Patient name and       **Reason for Consult**  Reason for Consult: [facility]    **Admission**  Admission Date: 10-  Chief reason for ICU admission: ETOH W/D  Secondary reasons for ICU admission: Altered Mental Status    **Core Metrics**  General orienting sentence for patient: (10/31)  51 y/o male admitted with   ETOH W/D.   CIWA  Chief physiologic deterioration: Change in mental status  Is the patient on DVT prophylaxis?: Yes  Prophylaxis type: Pharmacological  Is the patient on GI prophylaxis?: Yes  Has this patient reached their nutritional goal?: No and issues are being   addressed  Are there current issues with pain management in this patient?:   No  Are there issues with skin integrity?: No  Are there issues with delirium?: Yes and issues are being addressed  Has the patient been mobilized?: No  Is this patient currently intubated?: No  Are there ethical or care philosophy or family issues?: No  Do you recommend an in depth evaluation?: No  Disposition Recommendations: Continue ICU level of care    **Physician Signature**  This document was electronically signed by: Lauren Mcmillan DO  10/31/2021 11:35   AM

## 2021-10-31 NOTE — PROGRESS NOTES
Follow-up note from this morning. Still actively detoxing. We will continue supportive care at this point and monitor.

## 2021-11-01 LAB
ALBUMIN SERPL-MCNC: 4.1 G/DL (ref 3.5–5.2)
ALP BLD-CCNC: 54 U/L (ref 40–130)
ALT SERPL-CCNC: 24 U/L (ref 5–41)
ANION GAP SERPL CALCULATED.3IONS-SCNC: 14 MMOL/L (ref 7–19)
AST SERPL-CCNC: 26 U/L (ref 5–40)
BILIRUB SERPL-MCNC: 0.7 MG/DL (ref 0.2–1.2)
BUN BLDV-MCNC: 4 MG/DL (ref 6–20)
CALCIUM SERPL-MCNC: 9 MG/DL (ref 8.6–10)
CHLORIDE BLD-SCNC: 92 MMOL/L (ref 98–111)
CO2: 27 MMOL/L (ref 22–29)
CREAT SERPL-MCNC: 0.6 MG/DL (ref 0.5–1.2)
EKG P AXIS: 74 DEGREES
EKG P-R INTERVAL: 160 MS
EKG Q-T INTERVAL: 394 MS
EKG QRS DURATION: 84 MS
EKG QTC CALCULATION (BAZETT): 441 MS
EKG T AXIS: 70 DEGREES
GFR AFRICAN AMERICAN: >59
GFR NON-AFRICAN AMERICAN: >60
GLUCOSE BLD-MCNC: 89 MG/DL (ref 74–109)
HCT VFR BLD CALC: 34.6 % (ref 42–52)
HEMOGLOBIN: 11.4 G/DL (ref 14–18)
INR BLD: 1.09 (ref 0.88–1.18)
LACTIC ACID: 0.7 MMOL/L (ref 0.5–1.9)
MAGNESIUM: 1.5 MG/DL (ref 1.6–2.6)
MAGNESIUM: 2.1 MG/DL (ref 1.6–2.6)
MCH RBC QN AUTO: 30.4 PG (ref 27–31)
MCHC RBC AUTO-ENTMCNC: 32.9 G/DL (ref 33–37)
MCV RBC AUTO: 92.3 FL (ref 80–94)
PDW BLD-RTO: 13.9 % (ref 11.5–14.5)
PHOSPHORUS: 2.2 MG/DL (ref 2.5–4.5)
PLATELET # BLD: 178 K/UL (ref 130–400)
PMV BLD AUTO: 9.5 FL (ref 9.4–12.4)
POTASSIUM REFLEX MAGNESIUM: 3.2 MMOL/L (ref 3.5–5)
POTASSIUM SERPL-SCNC: 3.7 MMOL/L (ref 3.5–5)
PROTHROMBIN TIME: 14.3 SEC (ref 12–14.6)
RBC # BLD: 3.75 M/UL (ref 4.7–6.1)
SODIUM BLD-SCNC: 133 MMOL/L (ref 136–145)
TOTAL PROTEIN: 7.4 G/DL (ref 6.6–8.7)
WBC # BLD: 7.8 K/UL (ref 4.8–10.8)

## 2021-11-01 PROCEDURE — 93010 ELECTROCARDIOGRAM REPORT: CPT | Performed by: INTERNAL MEDICINE

## 2021-11-01 PROCEDURE — 84132 ASSAY OF SERUM POTASSIUM: CPT

## 2021-11-01 PROCEDURE — 80053 COMPREHEN METABOLIC PANEL: CPT

## 2021-11-01 PROCEDURE — 6360000002 HC RX W HCPCS: Performed by: INTERNAL MEDICINE

## 2021-11-01 PROCEDURE — 85027 COMPLETE CBC AUTOMATED: CPT

## 2021-11-01 PROCEDURE — 83735 ASSAY OF MAGNESIUM: CPT

## 2021-11-01 PROCEDURE — 36415 COLL VENOUS BLD VENIPUNCTURE: CPT

## 2021-11-01 PROCEDURE — 92610 EVALUATE SWALLOWING FUNCTION: CPT

## 2021-11-01 PROCEDURE — 2700000000 HC OXYGEN THERAPY PER DAY

## 2021-11-01 PROCEDURE — 83605 ASSAY OF LACTIC ACID: CPT

## 2021-11-01 PROCEDURE — 85610 PROTHROMBIN TIME: CPT

## 2021-11-01 PROCEDURE — 2000000000 HC ICU R&B

## 2021-11-01 PROCEDURE — 2500000003 HC RX 250 WO HCPCS: Performed by: INTERNAL MEDICINE

## 2021-11-01 PROCEDURE — 84100 ASSAY OF PHOSPHORUS: CPT

## 2021-11-01 PROCEDURE — 6370000000 HC RX 637 (ALT 250 FOR IP): Performed by: INTERNAL MEDICINE

## 2021-11-01 PROCEDURE — 2580000003 HC RX 258: Performed by: INTERNAL MEDICINE

## 2021-11-01 RX ADMIN — MAGNESIUM SULFATE HEPTAHYDRATE 2000 MG: 40 INJECTION, SOLUTION INTRAVENOUS at 21:44

## 2021-11-01 RX ADMIN — ENOXAPARIN SODIUM 40 MG: 100 INJECTION SUBCUTANEOUS at 08:30

## 2021-11-01 RX ADMIN — LORAZEPAM 2 MG: 2 INJECTION INTRAMUSCULAR; INTRAVENOUS at 08:19

## 2021-11-01 RX ADMIN — Medication 10 ML: at 08:40

## 2021-11-01 RX ADMIN — FAMOTIDINE 20 MG: 10 INJECTION, SOLUTION INTRAVENOUS at 19:47

## 2021-11-01 RX ADMIN — THIAMINE HYDROCHLORIDE 100 MG: 100 INJECTION, SOLUTION INTRAMUSCULAR; INTRAVENOUS at 08:13

## 2021-11-01 RX ADMIN — SODIUM CHLORIDE 25 ML: 9 INJECTION, SOLUTION INTRAVENOUS at 19:47

## 2021-11-01 RX ADMIN — PIPERACILLIN AND TAZOBACTAM 3375 MG: 3; .375 INJECTION, POWDER, LYOPHILIZED, FOR SOLUTION INTRAVENOUS at 19:47

## 2021-11-01 RX ADMIN — PIPERACILLIN AND TAZOBACTAM 3375 MG: 3; .375 INJECTION, POWDER, LYOPHILIZED, FOR SOLUTION INTRAVENOUS at 07:30

## 2021-11-01 RX ADMIN — POTASSIUM CHLORIDE 10 MEQ: 7.46 INJECTION, SOLUTION INTRAVENOUS at 07:55

## 2021-11-01 RX ADMIN — PIPERACILLIN AND TAZOBACTAM 3375 MG: 3; .375 INJECTION, POWDER, LYOPHILIZED, FOR SOLUTION INTRAVENOUS at 13:21

## 2021-11-01 RX ADMIN — PIPERACILLIN AND TAZOBACTAM 3375 MG: 3; .375 INJECTION, POWDER, LYOPHILIZED, FOR SOLUTION INTRAVENOUS at 00:25

## 2021-11-01 RX ADMIN — Medication 10 ML: at 19:48

## 2021-11-01 RX ADMIN — LORAZEPAM 2 MG: 2 INJECTION INTRAMUSCULAR; INTRAVENOUS at 13:22

## 2021-11-01 RX ADMIN — FAMOTIDINE 20 MG: 10 INJECTION, SOLUTION INTRAVENOUS at 08:13

## 2021-11-01 RX ADMIN — POTASSIUM CHLORIDE 10 MEQ: 7.46 INJECTION, SOLUTION INTRAVENOUS at 04:44

## 2021-11-01 RX ADMIN — POTASSIUM CHLORIDE 10 MEQ: 7.46 INJECTION, SOLUTION INTRAVENOUS at 03:52

## 2021-11-01 RX ADMIN — POTASSIUM CHLORIDE 10 MEQ: 7.46 INJECTION, SOLUTION INTRAVENOUS at 05:29

## 2021-11-01 ASSESSMENT — PAIN SCALES - GENERAL
PAINLEVEL_OUTOF10: 0

## 2021-11-01 NOTE — CONSULTS
**Physician Signature**  This document was electronically signed by: Paige Betancur MD  10/31/2021   10:08 PM    **Consult Information**  Member Facility: [address] [place] [address] MRN: [id number]  Visit/Encounter Number: [id number]  Consult ID: [id number]  Facility Time Zone: CT  Date and Time of Request: 10/31/2021 07:30 PM  CT  Requesting Clinician: Lashell Galvan MD  Patient Name: Jessica Bolaños  YOB: 1975  Gender: Male  Patient identity was confirmed at the beginning of the consult with the   patient/family/staff using two personal identifiers: Patient name and       **Reason for Consult**  Reason for Consult: [facility]    **Admission**  Admission Date: 10-  Chief reason for ICU admission: ETOH W/D  Secondary reasons for ICU admission: Altered Mental Status    **Core Metrics**  General orienting sentence for patient: (10/31)  51 y/o male [facility]   [facility]   Chief physiologic deterioration: Change in mental status  Is the patient on DVT prophylaxis?: Yes  Prophylaxis type: Pharmacological  Is the patient on GI prophylaxis?: Yes  Has this patient reached their nutritional goal?: No and issues are being   addressed  Are there current issues with pain management in this patient?:   No  Are there issues with skin integrity?: No  Are there issues with delirium?: Yes and issues are being addressed  Has the patient been mobilized?: No  Is this patient currently intubated?: No  Are there ethical or care philosophy or family issues?: No  Do you recommend an in depth evaluation?: No  Disposition Recommendations: Continue ICU level of care    **Physician Signature**  This document was electronically signed by: Paige Betancur MD  10/31/2021   10:08 PM

## 2021-11-01 NOTE — PROGRESS NOTES
Speech Language Pathology  Facility/Department: [facility] ICU   CLINICAL BEDSIDE SWALLOW EVALUATION    NAME: Korina Nichols  : 1975  MRN: [id number]  ADMISSION DATE: 10/30/2021   Date of Eval: 2021  Evaluating Therapist: Rema Wiseman MS CCC-SLP        ADMITTING DIAGNOSIS:   has Acute alcohol intoxication delirium with mild use disorder ([facility] Seizure disorder ([facility] Confusion; Anorexia; Weight loss; Dehydration; Electrolyte abnormality; CKD (chronic kidney disease); Tobacco abuse; and COPD (chronic obstructive pulmonary disease) ([facility] [address] [facility] on their problem list.    History:  Per physician notes: Patient transferred from outside hospital. He is at [facility] ER about 3 to 4 times per week. He is there due to acute alcohol intoxication and he is stabilized and improved and then gets discharged. This time, while in the ER he was more lethargic. He had not had etoh intake for about 2 to 3 days and was in full blown withdrawal and had an etoh related seizure.        Current Diet level:  Current Diet : NPO  Current Liquid Diet : NPO      Pain:  Pain Assessment  Pain Assessment: 0-10  Pain Level: 0  Patient's Stated Pain Goal: No pain  RASS Score: Drowsy - Patient awakens with sustained eye opening and eye contact    Reason for Referral  Korina Nichols was referred for a bedside swallow evaluation to assess the efficiency of his swallow function, identify signs and symptoms of aspiration and make recommendations regarding safe dietary consistencies, effective compensatory strategies, and safe eating environment. Impression  Dysphagia Impression : Mild oropharyngeal dysphagia due to slow oral movements, potentital for oral pocketing and premature loss of the bolus over the tongue base. Mildly decreased hyolaryngeal elevation and delayed pharyngeal swallow responses. He did exhibit a strong cough for adequate airway protection. Due to drowsiness, recommend a dysphagia 1 diet, puree with thin liquids.  Recommend crush medications in applesauce or pudding. Recommend assist feed. Please check the oral cavity for food/residue prior to lowering the head of the bed. Dysphagia Outcome Severity Scale: Level 5: Mild dysphagia- Distant supervision. May need one diet consistency restricted     Treatment Plan  Requires SLP Intervention: Yes  Duration/Frequency of Treatment: 1x/day, 3-5x/week  D/C Recommendations: To be determined       Recommended Diet and Intervention  Diet Solids Recommendation: Dysphagia Pureed (Dysphagia I)  Liquid Consistency Recommendation: Thin  Recommended Form of Meds: Crushed in puree as able     Therapeutic Interventions: Oral care; Patient/Family education;Pharyngeal exercises;Diet tolerance monitoring    Compensatory Swallowing Strategies  Compensatory Swallowing Strategies: Upright as possible for all oral intake;Remain upright for 30-45 minutes after meals; Check for pocketing of food on the Left; Check for pocketing of food on the Right;Assist feed;Eat/Feed slowly; Alternate solids and liquids    Treatment/Goals  Short-term Goals  Goal 1: The patient will tolerate a dysphagia 1 diet, puree with thin liquids with minimal overt s/s of aspiration. Goal 2: SLP will return to re-assess swallow function and insure safety with oral intake. Goal 3: The patient will complete a full cognitive linguistic assessment. Goal 4: The patient will complete pharyngeal exercises with minimal cues. General  Chart Reviewed: Yes  Behavior/Cognition: Confused  Temperature Spikes Noted: Yes  Respiratory Status: O2 via nasual cannula  O2 Device: Nasal cannula  Liters of Oxygen: 2 L  Follows Directions:  (Difficulty following commands)  Dentition: Adequate  Patient Positioning: Upright in bed  Baseline Vocal Quality:  (Functional)  Volitional Cough:  (Functional)  Prior Dysphagia History: No dysphagia history or MBSS history located in his electronic medical record  Consistencies Administered: Dysphagia Pureed (Dysphagia I); Reg solid; Thin - straw; Thin - cup; Thin - teaspoon      P.O. Trials:  Verbal and tactile stimuli were required. The patient did fall back asleep a few times. He did answer simple questions with brief answers. He tolerated ice chips, and thin liquids via tsp, cup and straw without difficulty. He also tolerated bites of puree well without oral residue or buccal cavity pocketing. He would not accept a solid despite cueing. He was easily distracted this date.         Prognosis  Good    Education  Patient Education Response: Needs reinforcement                         Electronically Signed By:  Ayah Madrigal  11/1/2021,1020 AM

## 2021-11-01 NOTE — PLAN OF CARE
Problem: Nutrition  Goal: Optimal nutrition therapy  Outcome: Ongoing       Problem: Nutrition  Goal: Optimal nutrition therapy  11/1/2021 1618 by Columba Roberson MS, LEATHA, LD  Note: Nutrition Problem #1: Unintended weight loss  Intervention: Food and/or Nutrient Delivery: Continue Current Diet, Start Oral Nutrition Supplement  Nutritional Goals: PO 75% or more, wt stable or gain    11/1/2021 1618 by Columba Roberson MS, LEATHA, LD  Outcome: Ongoing

## 2021-11-01 NOTE — PROGRESS NOTES
Hospitalist Progress Note    Patient:  Edel Grimes  YOB: 1975  Date of Service: 11/1/2021  MRN: [id number]   Acct: [de-identified]   Primary Care Physician: Moiz Sutton MD  Advance Directive: Full Code  Admit Date: 10/30/2021       Hospital Day: 2  Referring Provider: Adolfo Ashraf DO    Patient Seen, Chart, Consults, Notes, Labs, Radiology studies reviewed. Subjective:  Edel Grimes is a 50 y.o. male  whom we are following for alcohol withdrawal.  He is still requiring sedation at this point, however, his neurologic status is improving. Hemodynamics are stable.     Allergies:  Codeine and Other    Medicines:  Current Facility-Administered Medications   Medication Dose Route Frequency Provider Last Rate Last Admin    potassium chloride 10 mEq/100 mL IVPB (Peripheral Line)  10 mEq IntraVENous PRN Ayaan Jorgensen MD   Stopped at 11/01/21 0715    magnesium sulfate 1000 mg in dextrose 5% 100 mL IVPB  1,000 mg IntraVENous PRN Ayaan Jorgensen MD        sodium chloride flush 0.9 % injection 5-40 mL  5-40 mL IntraVENous 2 times per day Ayaan Jorgensen MD   10 mL at 11/01/21 0840    sodium chloride flush 0.9 % injection 5-40 mL  5-40 mL IntraVENous PRN Ayaan Jorgensen MD        0.9 % sodium chloride infusion  25 mL IntraVENous PRN Ayaan Jorgensen MD        enoxaparin (LOVENOX) injection 40 mg  40 mg SubCUTAneous Daily Ayaan Jorgensen MD   40 mg at 11/01/21 0830    ondansetron (ZOFRAN-ODT) disintegrating tablet 4 mg  4 mg Oral Q8H PRN Ayaan Jorgensen MD        Or    ondansetron (ZOFRAN) injection 4 mg  4 mg IntraVENous Q6H PRN Ayaan Jorgensen MD        polyethylene glycol (GLYCOLAX) packet 17 g  17 g Oral Daily PRN Ayaan Jorgensen MD        acetaminophen (TYLENOL) tablet 650 mg  650 mg Oral Q6H PRN Ayaan Jorgensen MD        Or    acetaminophen (TYLENOL) suppository 650 mg  650 mg Rectal [facility]6H [facility] Foster [facility]           Past Medical History:  Past Medical History:   Diagnosis Date    Cirrhosis ([facility]     [facility]     ETOH abuse     GERD (gastroesophageal reflux disease)     Heart murmur     Hypertension     Hypertension     Pancreatitis     Stomach cancer [facility])        Past Surgical History:  No past surgical history on file. Family History  Family History   Problem Relation Age of Onset    Cancer Mother     Heart Failure Mother     COPD Mother     Heart Disease Father     Lupus Father     Alcohol Abuse Father        Social History  Social History     Socioeconomic History    Marital status: Single     Spouse name: Not on file    Number of children: Not on file    Years of education: Not on file    Highest education level: Not on file   Occupational History    Not on file   Tobacco Use    Smoking status: Current Every Day Smoker     Years: 35.00     Types: Cigarettes    Smokeless tobacco: Current User     Types: Snuff, Chew   Substance and Sexual Activity    Alcohol use: Not Currently     Alcohol/week: 3.0 standard drinks     Types: 3 Shots of liquor per week     Comment: 3 or more drinks a day    Drug use: Yes     Types: Marijuana Juanetta Benzie)    Sexual activity: Not on file   Other Topics Concern    Not on file   Social History Narrative    Not on file     Social Determinants of Health     Financial Resource Strain:     Difficulty of Paying Living Expenses:    Food Insecurity:     Worried About Running Out of Food in the Last Year:     Ran Out of Food in the Last Year:    Transportation Needs:     Lack of Transportation (Medical):      Lack of Transportation (Non-Medical):    Physical Activity:     Days of Exercise per Week:     Minutes of Exercise per Session:    Stress:     Feeling of Stress :    Social Connections:     Frequency of Communication with Friends and Family:     Frequency of Social Gatherings with Friends and Family:     Attends Zoroastrian Services:     Active Member of Clubs or Organizations:     Attends Club or Organization Meetings:     Marital Status:    Intimate Partner Violence:     Fear of Current or Ex-Partner:     Emotionally Abused:     Physically Abused:     Sexually Abused:          Review of Systems:    Review of Systems   Unable to perform ROS: Mental status change           Objective:  Blood pressure 120/71, pulse 117, temperature 98 °F (36.7 °C), temperature source Temporal, resp. rate 13, height 5' 6\" (1.676 m), weight 101 lb 6.4 oz (46 kg), SpO2 99 %. Intake/Output Summary (Last 24 hours) at 11/1/2021 1820  Last data filed at 11/1/2021 1200  Gross per 24 hour   Intake 940.16 ml   Output 760 ml   Net 180.16 ml       Physical Exam  Vitals and nursing note reviewed. HENT:      Head: Normocephalic and atraumatic. Right Ear: External ear normal.      Left Ear: External ear normal.      Nose: Nose normal.      Mouth/Throat:      Mouth: Mucous membranes are moist.   Eyes:      Conjunctiva/sclera: Conjunctivae normal.      Pupils: Pupils are equal, round, and reactive to light. Cardiovascular:      Rate and Rhythm: Normal rate and regular rhythm. Heart sounds: Normal heart sounds. Pulmonary:      Effort: Pulmonary effort is normal.      Breath sounds: Normal breath sounds. Abdominal:      General: Abdomen is flat. Palpations: Abdomen is soft. Musculoskeletal:         General: No swelling. Cervical back: Neck supple. No rigidity. Skin:     General: Skin is warm and dry. Neurological:      Mental Status: He is disoriented. Psychiatric:         Mood and Affect: Mood normal.         Thought Content:  Thought content normal.         Labs:  BMP:   Recent Labs     10/31/21  0010 10/31/21  0633 11/01/21  0224   * 129* 133*   K 3.0* 3.1* 3.2*   CL 92* 88* 92*   CO2 25 29 27   PHOS  --   --  2.2*   BUN 7 5* 4*   CREATININE 0.5 0.6 0.6   CALCIUM 8.8 9.1 9.0     CBC:   Recent Labs monitoring. Please document 38 minutes of critical care time for patient assessment, chart review, discussion with staff, .        Melvina Cortez, DO

## 2021-11-01 NOTE — PROGRESS NOTES
Nutrition Assessment     Type and Reason for Visit: Positive Nutrition Screen    Nutrition Assessment:  Pt is nutritionally compromised AEB wt loss r/t substance abuse. Diet order per SLP. Will order ONS and follow for full assessment. Malnutrition Assessment:  Malnutrition Status: Insufficient data    Estimated Daily Nutrient Needs:  Energy (kcal): 1022-4761; Weight Used for Energy Requirements:  Admission     Protein (g): 69-83; Weight Used for Protein Requirements:  Admission (1.5-1.8 g/kg)        Fluid (ml/day): 4386-5518; Weight Used for Fluid Requirements:  1 ml/kcal      Nutrition Related Findings: AMS, pt drowsy, confused      Current Nutrition Therapies:    ADULT DIET; Dysphagia - Pureed    Anthropometric Measures:  · Height: 5' 6\" (167.6 cm)  · Current Body Wt: 101 lb (45.8 kg)   · BMI: 16.3    Nutrition Diagnosis:   · Unintended weight loss related to inadequate protein-energy intake as evidenced by weight loss greater than or equal to 20% in 1 year      Nutrition Interventions:   Food and/or Nutrient Delivery:  Continue Current Diet, Start Oral Nutrition Supplement  Nutrition Education/Counseling:  No recommendation at this time   Coordination of Nutrition Care:  Continue to monitor while inpatient    Goals:  PO 75% or more, wt stable or gain       Nutrition Monitoring and Evaluation:   Behavioral-Environmental Outcomes:  None Identified   Food/Nutrient Intake Outcomes:  Food and Nutrient Intake, Supplement Intake  Physical Signs/Symptoms Outcomes:  Biochemical Data, Chewing or Swallowing, Nutrition Focused Physical Findings, Skin, Weight     Discharge Planning:     Too soon to determine     Electronically signed by Myles Toledo, MS, RD, LD on 11/1/21 at 4:17 PM CDT    Contact: [phone]

## 2021-11-02 LAB
ANION GAP SERPL CALCULATED.3IONS-SCNC: 15 MMOL/L (ref 7–19)
BASOPHILS ABSOLUTE: 0.1 K/UL (ref 0–0.2)
BASOPHILS RELATIVE PERCENT: 0.9 % (ref 0–1)
BUN BLDV-MCNC: 4 MG/DL (ref 6–20)
CALCIUM SERPL-MCNC: 8.7 MG/DL (ref 8.6–10)
CHLORIDE BLD-SCNC: 95 MMOL/L (ref 98–111)
CO2: 21 MMOL/L (ref 22–29)
CREAT SERPL-MCNC: 0.5 MG/DL (ref 0.5–1.2)
EOSINOPHILS ABSOLUTE: 0.1 K/UL (ref 0–0.6)
EOSINOPHILS RELATIVE PERCENT: 1.6 % (ref 0–5)
GFR AFRICAN AMERICAN: >59
GFR NON-AFRICAN AMERICAN: >60
GLUCOSE BLD-MCNC: 96 MG/DL (ref 74–109)
HCT VFR BLD CALC: 31.4 % (ref 42–52)
HEMOGLOBIN: 10.1 G/DL (ref 14–18)
IMMATURE GRANULOCYTES #: 0 K/UL
LYMPHOCYTES ABSOLUTE: 0.9 K/UL (ref 1.1–4.5)
LYMPHOCYTES RELATIVE PERCENT: 12 % (ref 20–40)
MAGNESIUM: 1.6 MG/DL (ref 1.6–2.6)
MCH RBC QN AUTO: 30.4 PG (ref 27–31)
MCHC RBC AUTO-ENTMCNC: 32.2 G/DL (ref 33–37)
MCV RBC AUTO: 94.6 FL (ref 80–94)
MONOCYTES ABSOLUTE: 1.4 K/UL (ref 0–0.9)
MONOCYTES RELATIVE PERCENT: 19.3 % (ref 0–10)
NEUTROPHILS ABSOLUTE: 4.9 K/UL (ref 1.5–7.5)
NEUTROPHILS RELATIVE PERCENT: 66.1 % (ref 50–65)
PDW BLD-RTO: 13.8 % (ref 11.5–14.5)
PLATELET # BLD: 191 K/UL (ref 130–400)
PMV BLD AUTO: 9.3 FL (ref 9.4–12.4)
POTASSIUM SERPL-SCNC: 3.7 MMOL/L (ref 3.5–5)
RBC # BLD: 3.32 M/UL (ref 4.7–6.1)
SODIUM BLD-SCNC: 131 MMOL/L (ref 136–145)
URINE CULTURE, ROUTINE: NORMAL
WBC # BLD: 7.4 K/UL (ref 4.8–10.8)

## 2021-11-02 PROCEDURE — 6370000000 HC RX 637 (ALT 250 FOR IP): Performed by: INTERNAL MEDICINE

## 2021-11-02 PROCEDURE — 80048 BASIC METABOLIC PNL TOTAL CA: CPT

## 2021-11-02 PROCEDURE — 6360000002 HC RX W HCPCS: Performed by: INTERNAL MEDICINE

## 2021-11-02 PROCEDURE — 92526 ORAL FUNCTION THERAPY: CPT

## 2021-11-02 PROCEDURE — 92523 SPEECH SOUND LANG COMPREHEN: CPT

## 2021-11-02 PROCEDURE — 2500000003 HC RX 250 WO HCPCS: Performed by: INTERNAL MEDICINE

## 2021-11-02 PROCEDURE — 36415 COLL VENOUS BLD VENIPUNCTURE: CPT

## 2021-11-02 PROCEDURE — 85025 COMPLETE CBC W/AUTO DIFF WBC: CPT

## 2021-11-02 PROCEDURE — 1210000000 HC MED SURG R&B

## 2021-11-02 PROCEDURE — 83735 ASSAY OF MAGNESIUM: CPT

## 2021-11-02 PROCEDURE — 2580000003 HC RX 258: Performed by: INTERNAL MEDICINE

## 2021-11-02 RX ORDER — LISINOPRIL 10 MG/1
10 TABLET ORAL DAILY
Status: DISCONTINUED | OUTPATIENT
Start: 2021-11-02 | End: 2021-11-04 | Stop reason: HOSPADM

## 2021-11-02 RX ADMIN — Medication 10 ML: at 08:30

## 2021-11-02 RX ADMIN — LISINOPRIL 10 MG: 10 TABLET ORAL at 13:06

## 2021-11-02 RX ADMIN — THIAMINE HYDROCHLORIDE 100 MG: 100 INJECTION, SOLUTION INTRAMUSCULAR; INTRAVENOUS at 07:26

## 2021-11-02 RX ADMIN — FAMOTIDINE 20 MG: 10 INJECTION, SOLUTION INTRAVENOUS at 08:30

## 2021-11-02 RX ADMIN — PIPERACILLIN AND TAZOBACTAM 3375 MG: 3; .375 INJECTION, POWDER, LYOPHILIZED, FOR SOLUTION INTRAVENOUS at 05:51

## 2021-11-02 RX ADMIN — ENOXAPARIN SODIUM 40 MG: 100 INJECTION SUBCUTANEOUS at 08:30

## 2021-11-02 RX ADMIN — MAGNESIUM SULFATE HEPTAHYDRATE 2000 MG: 40 INJECTION, SOLUTION INTRAVENOUS at 13:06

## 2021-11-02 RX ADMIN — PIPERACILLIN AND TAZOBACTAM 3375 MG: 3; .375 INJECTION, POWDER, LYOPHILIZED, FOR SOLUTION INTRAVENOUS at 00:14

## 2021-11-02 ASSESSMENT — PAIN SCALES - GENERAL
PAINLEVEL_OUTOF10: 0

## 2021-11-02 NOTE — PROGRESS NOTES
intake. Goal 3: Monitor [id number] functioning. Subjective:  Chart Reviewed: Yes  Patient assessed for rehabilitation services?: Yes  Family / Caregiver Present: No     Objective: Auditory Comprehension  Comprehension:  (While delayed, patient demonstrated ability to answer simple yes/no questions regarding immediate environment and current state of being at independent level. While delayed, patient demonstrated ability to follow simple 1 step commands independently. Delays were noted and break down was observed during yes/no questions of increased complexity and during complex 1 and simple 2 step commands.)     Expression  Primary Mode of Expression:  (Confrontation naming of items in room was considered to be delayed. During structured responsive speech and responses in natural conversation, patient exhibited frequent delays with moderate instances where utterances did not pertain to topics and questions at hand.)     Motor Speech:  (Patient exhibited decreased volume of speech during verbalizations.)     Overall Orientation Status:  (Patient demonstrated ability to verbalize name, birthday, age, city, state, and hospital at independent level. Patient did not verbalize address, phone number, month, DAVID, or date independently.)     Memory:  (Patient demonstrated decreased immediate memory with sequences of unrelated numbers/words set up to 3 items without repetitions provided. Patient demonstrated decreased short-term memory with less than 5 minute delay+distractions present.)     Problem Solving:  (Patient demonstrated difficulty verbalizing appropriate full solutions to situations that could occur during activities of daily living at independent level.)    Additional Assessment:   Re-assessed patient's swallowing function. Oral transit of puree consistency presentations, administered by SLP, primarily measured 1-2 seconds in length and no oral cavity residue was noted post swallows.  With regular solid consistency trials presented by SLP, patient exhibited adequate oral prep. Oral transit of regular solid consistency primarily measured 1-2 seconds in length and min oral cavity residue was observed post swallows; residue cleared from the mouth with additional dry swallows. Oral transit thin H2O presentations, administered via straw by SLP, primarily measured 1-2 seconds in length. Laryngeal elevation during swallow initiation was considered to be inconsistently sluggish for swallow airway protection. Even so, no outward S/S penetration/aspiration was noted with any puree consistency presentation, regular solid consistency trial, or thin H2O presentation administered during treatment session this date. At this time, would trial easy to chew consistency. Continue thin liquids. Recommend meds whole in pudding/applesauce. Will continue to follow.     Electronically signed by BLANE Diaz on 11/2/2021 at 1:51 PM

## 2021-11-02 NOTE — CONSULTS
**Physician Signature**  This document was electronically signed by: Damir De La Cruz MD  2021   10:19 PM    **Consult Information**  Member Facility: [address] [place] [address] MRN: [id number]  Visit/Encounter Number: [id number]  Consult ID: [id number]  Facility Time Zone: CT  Date and Time of Request: 2021 09:29 PM  CT  Requesting Clinician: Jaxon Sanchez MD  Patient Name: Mark Anthony Damico  YOB: 1975  Gender: Male  Patient identity was confirmed at the beginning of the consult with the   patient/family/staff using two personal identifiers: Patient name and       **Reason for Consult**  Reason for Consult: [facility]    **Admission**  Admission Date: 10-  Chief reason for ICU admission: ETOH W/D  Secondary reasons for ICU admission: Altered Mental Status    **Core Metrics**  General orienting sentence for patient: (10/31)  51 y/o male admitted with   ETOH W/D. CIWA ativan. swallow eval. Diet to be ordered. He is now oriented.    electrolytes, not on precedex  Chief physiologic deterioration: Change in mental status  Is the patient on DVT prophylaxis?: Yes  Prophylaxis type: Pharmacological  DVT Prophylaxis Comments: Lovenox  Is the patient on GI prophylaxis?: Yes  Gi Prophylaxis Comments: Pepcid  Has this patient reached their nutritional goal?: Yes  Are there current issues with pain management in this patient?:   No  Are there issues with skin integrity?: No  Are there issues with delirium?: Yes and issues are being addressed  Has the patient been mobilized?: No  Is this patient currently intubated?: No  Are there ethical or care philosophy or family issues?: No  Do you recommend an in depth evaluation?: No  Disposition Recommendations: Downgrade/transition out of ICU    **Inserted/Removed Devices**  Device Name: Ardon Catheter  Site of insertion: Urethra  Date of insertion: Unknown    **Physician Signature**  This document was electronically signed by: Damir De La Cruz MD 11/01/2021   10:19 PM

## 2021-11-02 NOTE — PROGRESS NOTES
Hospitalist Progress Note    Patient:  Gisella Goodman  YOB: 1975  Date of Service: 11/2/2021  MRN: [id number]   Acct: [de-identified]   Primary Care Physician: Katalina Almeida MD  Advance Directive: Full Code  Admit Date: 10/30/2021       Hospital Day: 3  Referring Provider: Alexandrea Calhoun DO    Patient Seen, Chart, Consults, Notes, Labs, Radiology studies reviewed. Subjective:  Gisella Goodman is a 50 y.o. male  whom we are following for alcohol withdrawal.  He is now off sedation. He is able to eat. He is doing much better from a neurologic standpoint. He is stable for transfer out of ICU.     Allergies:  Codeine and Mushroom extract complex    Medicines:  Current Facility-Administered Medications   Medication Dose Route Frequency Provider Last Rate Last Admin    lisinopril (PRINIVIL;ZESTRIL) tablet 10 mg  10 mg Oral Daily Alexandrea Calhoun DO        magnesium sulfate 1000 mg in dextrose 5% 100 mL IVPB  1,000 mg IntraVENous PRN Moose West MD        sodium chloride flush 0.9 % injection 5-40 mL  5-40 mL IntraVENous 2 times per day Moose West MD   10 mL at 11/02/21 0830    sodium chloride flush 0.9 % injection 5-40 mL  5-40 mL IntraVENous PRN Mooes West MD        0.9 % sodium chloride infusion  25 mL IntraVENous PRN Moose West  mL/hr at 11/01/21 1947 25 mL at 11/01/21 1947    enoxaparin (LOVENOX) injection 40 mg  40 mg SubCUTAneous Daily Moose West MD   40 mg at 11/02/21 0830    ondansetron (ZOFRAN-ODT) disintegrating tablet 4 mg  4 mg Oral Q8H PRN Moose West MD        Or    ondansetron (ZOFRAN) injection 4 mg  4 mg IntraVENous Q6H PRN Moose West MD        polyethylene glycol (GLYCOLAX) packet 17 g  17 g Oral Daily PRN Moose West MD        acetaminophen (TYLENOL) tablet 650 mg  650 mg Oral Q6H PRN Moose West MD        Or    acetaminophen (TYLENOL) suppository 650 mg  650 mg Rectal Q6H PRN Sandeep Butterfield MD        potassium chloride (KLOR-CON M) extended release tablet 40 mEq  40 mEq Oral PRN Sandeep Butterfield MD        Or    potassium bicarb-citric acid (EFFER-K) effervescent tablet 40 mEq  40 mEq Oral PRN Sandeep Butterfield MD        Or    potassium chloride 10 mEq/100 mL IVPB (Peripheral Line)  10 mEq IntraVENous PRN Sandeep Butterfield  mL/hr at 11/01/21 0755 10 mEq at 11/01/21 0755    potassium chloride 10 mEq/100 mL IVPB (Peripheral Line)  10 mEq IntraVENous PRN Sandeep Butterfield MD   Stopped at 11/01/21 0715    magnesium sulfate 2000 mg in 50 mL IVPB premix  2,000 mg IntraVENous PRN Sandeep Butterfield MD   Stopped at 11/01/21 2343    nicotine (NICODERM CQ) 21 MG/24HR 1 patch  1 patch TransDERmal Daily Sandeep Butterfield MD   1 patch at 11/02/21 0830       Past Medical History:  Past Medical History:   Diagnosis Date    [facility] [address]     Diabetes mellitus ([facility] [address] [facility]     ETOH abuse     GERD (gastroesophageal reflux disease)     Heart murmur     Hypertension     Hypertension     Pancreatitis     Stomach cancer [facility])        Past Surgical History:  No past surgical history on file.     Family History  Family History   Problem Relation Age of Onset    Cancer Mother     Heart Failure Mother     COPD Mother     Heart Disease Father     Lupus Father     Alcohol Abuse Father        Social History  Social History     Socioeconomic History    Marital status: Single     Spouse name: Not on file    Number of children: Not on file    Years of education: Not on file    Highest education level: Not on file   Occupational History    Not on file   Tobacco Use    Smoking status: Current Every Day Smoker     Years: 35.00     Types: Cigarettes    Smokeless tobacco: Current User     Types: Snuff, Chew   Substance and Sexual Activity    Alcohol use: Not Currently     Alcohol/week: 3.0 standard drinks     Types: 3 Shots of liquor per week     Comment: 3 or more drinks a day    Drug use: Yes     Types: Marijuana Ophelia Kos)    Sexual activity: Not on file   Other Topics Concern    Not on file   Social History Narrative    Not on file     Social Determinants of Health     Financial Resource Strain:     Difficulty of Paying Living Expenses:    Food Insecurity:     Worried About Running Out of Food in the Last Year:     [address] in the Last Year:    Transportation Needs:     Lack of Transportation (Medical):  Lack of Transportation (Non-Medical):    Physical Activity:     Days of Exercise per Week:     Minutes of Exercise per Session:    Stress:     Feeling of Stress :    Social Connections:     Frequency of Communication with Friends and Family:     Frequency of Social Gatherings with Friends and Family:     Attends Jain Services:     Active Member of Clubs or Organizations:     Attends Club or Organization Meetings:     Marital Status:    Intimate Partner Violence:     Fear of Current or Ex-Partner:     Emotionally Abused:     Physically Abused:     Sexually Abused:          Review of Systems:    Review of Systems   Unable to perform ROS: Mental status change           Objective:  Blood pressure 139/88, pulse 113, temperature 98 °F (36.7 °C), temperature source Temporal, resp. rate 15, height 5' 6\" (1.676 m), weight 102 lb 4.8 oz (46.4 kg), SpO2 99 %. Intake/Output Summary (Last 24 hours) at 11/2/2021 1237  Last data filed at 11/2/2021 0600  Gross per 24 hour   Intake 464.59 ml   Output 850 ml   Net -385.41 ml       Physical Exam  Vitals and nursing note reviewed. Constitutional:       Appearance: He is ill-appearing. HENT:      Head: Normocephalic and atraumatic.       Right Ear: External ear normal.      Left Ear: External ear normal.      Nose: Nose normal.      Mouth/Throat:      Mouth: Mucous membranes are moist.   Eyes:      Conjunctiva/sclera: Conjunctivae normal.      Pupils: Pupils are equal, round, and reactive to light. Cardiovascular:      Rate and Rhythm: Normal rate and regular rhythm. Heart sounds: Normal heart sounds. Pulmonary:      Effort: Pulmonary effort is normal.      Breath sounds: Normal breath sounds. Abdominal:      General: Abdomen is flat. Palpations: Abdomen is soft. Musculoskeletal:         General: No swelling. Cervical back: Neck supple. No rigidity. Skin:     General: Skin is warm and dry. Neurological:      Mental Status: He is alert. He is disoriented. Labs:  BMP:   Recent Labs     10/31/21  0633 10/31/21  0633 11/01/21  0224 11/01/21  1904 11/02/21  0754   *  --  133*  --  131*   K 3.1*   < > 3.2* 3.7 3.7   CL 88*  --  92*  --  95*   CO2 29  --  27  --  21*   PHOS  --   --  2.2*  --   --    BUN 5*  --  4*  --  4*   CREATININE 0.6  --  0.6  --  0.5   CALCIUM 9.1  --  9.0  --  8.7    < > = values in this interval not displayed. CBC:   Recent Labs     10/31/21  0633 11/01/21  0224 11/02/21  0754   WBC 11.9* 7.8 7.4   HGB 10.6* 11.4* 10.1*   HCT 32.1* 34.6* 31.4*   MCV 92.0 92.3 94.6*    178 191     LIVER PROFILE:   Recent Labs     10/31/21  0010 10/31/21  0633 11/01/21  0224   AST 37 32 26   ALT 28 26 24   BILIDIR  --  0.4*  --    BILITOT 0.8 1.3* 0.7   ALKPHOS 59 58 54     PT/INR:   Recent Labs     11/01/21 0224   PROTIME 14.3   INR 1.09     APTT: No results for input(s): APTT in the last 72 hours. BNP:  No results for input(s): BNP in the last 72 hours. Ionized Calcium:No results for input(s): IONCA in the last 72 hours. Magnesium:  Recent Labs     11/01/21 0224 11/01/21 1904 11/02/21 0754   MG 2.1 1.5* 1.6     Phosphorus:  Recent Labs     11/01/21 0224   PHOS 2.2*     HgbA1C: No results for input(s): LABA1C in the last 72 hours.   Hepatic:   Recent Labs     10/31/21  0010 10/31/21  0633 11/01/21  0224   ALKPHOS 59 58 54   ALT 28 26 24   AST 37 32 26   PROT 6.6 7.0 7.4   BILITOT 0.8 1.3* 0.7   BILIDIR  --  0.4* --    LABALBU 4.0 4.5 4.1     Lactic Acid:   Recent Labs     11/01/21  0224   LACTA 0.7     Troponin: No results for input(s): CKTOTAL, CKMB, TROPONINT in the last 72 hours. ABGs: No results for input(s): PH, PCO2, PO2, HCO3, O2SAT in the last 72 hours. CRP:  No results for input(s): CRP in the last 72 hours. Sed Rate:  No results for input(s): SEDRATE in the last 72 hours. Cultures:   No results for input(s): CULTURE in the last 72 hours. Recent Labs     10/31/21  0240 10/31/21  0259   BC No Growth to date. Any change in status will be called. --    BLOODCULT2  --  No Growth to date. Any change in status will be called. No results for input(s): CXSURG in the last 72 hours. Radiology reports as per the Radiologist  Radiology: No results found. Assessment     Acute alcohol intoxication delirium. Now detoxed. Continue supportive care.     Seizure disorder. No evidence of seizure activity at this point. Continue monitoring. Transfer out of ICU.     Please document 39 minutes of critical care time for patient assessment, chart review, discussion with staff, .       Mary Murray, DO

## 2021-11-02 NOTE — PROGRESS NOTES
Transported to [address] [place] [address] w/c with transportation. Pt is alert and oriented. Incontinent soft brown stool.

## 2021-11-02 NOTE — CARE COORDINATION
Date / Time of Evaluation:   11/2/2021    12:00 PM  Assessment Completed by:   CHARITO Nava      Patient Name:   Toni Zamora  MRN:   [id number]  [place]:   1975    Patient Admission Status:   Inpatient [101]    Patient Contact Information: he said he \"was\" living here; hard to get a determinate answer from Pt; he also noted he was currently at [facility], [address] [place] [address] [id number] (home)   Telephone Information:   Mobile [phone]     Above information verified? [x]   Yes  []   No    (Best Practice:   Have patient/caregiver verify above address and phone number by stating out loud their current address and reachable phone number. Initial Assessment Completed at bedside with:      [x]   Patient  []   Family/Caregiver/Guardian   []   Other:      Current PCP:    Ryan Rojo MD    PCP verified? [x]   Yes  []   No    Emergency Contacts: he stated Jesse Martinez is is ex-wife but she helps him if needed     Extended Emergency Contact Information  Primary Emergency Contact: Olivier  of [facility] Phone: [phone]  Relation: Child  Secondary Emergency Contact: Elsy Darnell [place] of [facility] Phone: [phone]  Mobile Phone: [phone]  Relation: Other    Advance Directives:    Does Mr. Una Vera have an advance directive in his electronic medical record? []   Yes  [x]   No    Code Status:   Full Code      Have you been vaccinated for COVID-19 (SARS-CoV-2)? []   Yes  [x]   No                   If so, when?     Which :         []   [employer]  []   [employer]  []   [employer]  []   Other:       Do you have any of the following unmet social needs that would keep you from returning home safely:    []   Yes  [x]   No                    Unmet Social Needs:           []   Living Situation/Housing  []   Food  []   Stroke Education   []   Utilities  []   Personal Safety  []   Financial Strain  []   Employment  []   [address]  []   Substance Abuse  []   Transportation Barriers    Additional Unmet Social Needs Notes:           Financial:    Payor: Tyrone Dolly. 93. / Plan: [address] / Product Type: *No Product type* /     Pre-Cert required for SNF:     [x]   Yes  []   No    Have Long Term Care Insurance:      []   Yes  [x]   No      Pharmacy: stated he uses [facility]  No Pharmacies Listed  Potential assistance purchasing medications? []   Yes  [x]   No      ADLS:       Support System:     noted he has a son, and a friend he lives with named Armida Varela. No other info determined      Current Home Environment:       Steps:       []   Yes  [x]   No    If yes, how many? Plans to RETURN to current housing:     [x]   Yes  []   No    Barriers to RETURNING to current housing:        Currently ACTIVE with Home Health CARE:      []   Yes  [x]   No    Home Health Care Agency:        DME Provider:    N/A      Had HOME OXYGEN prior to admission:      []   Yes  [x]   No      Active with HD/PD prior to admission:             []   Yes  [x]   No    Nephrologist:     HD Center:         Transition Plan:    plans to return back home with his friend Armida Varela;     Transportation PLAN for Discharge:  Private vehicle    Factors facilitating achievement of predicted outcomes:     Barriers to discharge: Additional CM/SW Notes: SW visited with Pt in ICU; re: ETOH rehab info and d/c planning; Pt noted he lives with his friend Armida Varela, and that he still works; he stated he has been to ETOH rehab 3 times; uncertain if he is interested in going again.            Albert Balderrama [address] Management  Phone:   [phone]          Fax:   [phone]  Electronically signed by CHARITO Villanueva on 11/2/2021 at 2:36 PM

## 2021-11-03 PROBLEM — R63.4 WEIGHT LOSS: Status: RESOLVED | Noted: 2021-10-31 | Resolved: 2021-11-03

## 2021-11-03 PROBLEM — R41.0 CONFUSION: Status: RESOLVED | Noted: 2021-10-31 | Resolved: 2021-11-03

## 2021-11-03 PROBLEM — F10.121 ACUTE ALCOHOL INTOXICATION DELIRIUM WITH MILD USE DISORDER (HCC): Status: RESOLVED | Noted: 2021-10-31 | Resolved: 2021-11-03

## 2021-11-03 PROBLEM — E86.0 DEHYDRATION: Status: RESOLVED | Noted: 2021-10-31 | Resolved: 2021-11-03

## 2021-11-03 PROBLEM — R63.0 ANOREXIA: Status: RESOLVED | Noted: 2021-10-31 | Resolved: 2021-11-03

## 2021-11-03 PROBLEM — E87.8 ELECTROLYTE ABNORMALITY: Status: RESOLVED | Noted: 2021-10-31 | Resolved: 2021-11-03

## 2021-11-03 PROBLEM — N18.9 CKD (CHRONIC KIDNEY DISEASE): Status: RESOLVED | Noted: 2021-10-31 | Resolved: 2021-11-03

## 2021-11-03 PROCEDURE — 2580000003 HC RX 258: Performed by: INTERNAL MEDICINE

## 2021-11-03 PROCEDURE — 6360000002 HC RX W HCPCS: Performed by: INTERNAL MEDICINE

## 2021-11-03 PROCEDURE — 6370000000 HC RX 637 (ALT 250 FOR IP): Performed by: INTERNAL MEDICINE

## 2021-11-03 PROCEDURE — 1210000000 HC MED SURG R&B

## 2021-11-03 RX ORDER — LORAZEPAM 1 MG/1
2 TABLET ORAL
Status: DISCONTINUED | OUTPATIENT
Start: 2021-11-03 | End: 2021-11-03

## 2021-11-03 RX ORDER — LORAZEPAM 1 MG/1
1 TABLET ORAL
Status: DISCONTINUED | OUTPATIENT
Start: 2021-11-03 | End: 2021-11-03

## 2021-11-03 RX ORDER — LORAZEPAM 2 MG/ML
1 INJECTION INTRAMUSCULAR
Status: DISCONTINUED | OUTPATIENT
Start: 2021-11-03 | End: 2021-11-03

## 2021-11-03 RX ORDER — LORAZEPAM 2 MG/ML
2 INJECTION INTRAMUSCULAR
Status: DISCONTINUED | OUTPATIENT
Start: 2021-11-03 | End: 2021-11-03

## 2021-11-03 RX ORDER — LORAZEPAM 2 MG/ML
3 INJECTION INTRAMUSCULAR
Status: DISCONTINUED | OUTPATIENT
Start: 2021-11-03 | End: 2021-11-03

## 2021-11-03 RX ORDER — LORAZEPAM 1 MG/1
3 TABLET ORAL
Status: DISCONTINUED | OUTPATIENT
Start: 2021-11-03 | End: 2021-11-03

## 2021-11-03 RX ORDER — LORAZEPAM 1 MG/1
4 TABLET ORAL
Status: DISCONTINUED | OUTPATIENT
Start: 2021-11-03 | End: 2021-11-03

## 2021-11-03 RX ORDER — LORAZEPAM 2 MG/ML
4 INJECTION INTRAMUSCULAR
Status: DISCONTINUED | OUTPATIENT
Start: 2021-11-03 | End: 2021-11-03

## 2021-11-03 RX ADMIN — LORAZEPAM 1 MG: 1 TABLET ORAL at 10:51

## 2021-11-03 RX ADMIN — LORAZEPAM 2 MG: 2 INJECTION, SOLUTION INTRAMUSCULAR; INTRAVENOUS at 10:52

## 2021-11-03 RX ADMIN — LORAZEPAM 2 MG: 2 INJECTION, SOLUTION INTRAMUSCULAR; INTRAVENOUS at 08:00

## 2021-11-03 RX ADMIN — Medication 10 ML: at 08:35

## 2021-11-03 RX ADMIN — LORAZEPAM 1 MG: 2 INJECTION, SOLUTION INTRAMUSCULAR; INTRAVENOUS at 04:30

## 2021-11-03 RX ADMIN — ENOXAPARIN SODIUM 40 MG: 100 INJECTION SUBCUTANEOUS at 08:34

## 2021-11-03 RX ADMIN — LISINOPRIL 10 MG: 10 TABLET ORAL at 08:34

## 2021-11-03 NOTE — PROGRESS NOTES
Hospitalist Progress Note    Patient:  Santhosh Queen  YOB: 1975  Date of Service: 11/3/2021  MRN: [id number]   Acct: [de-identified]   Primary Care Physician: Ame Allan MD  Advance Directive: Full Code  Admit Date: 10/30/2021       Hospital Day: 4  Referring Provider: Christo Steve DO    Patient Seen, Chart, Consults, Notes, Labs, Radiology studies reviewed. Subjective:  Santhosh Queen is a 50 y.o. male  whom we are following for alcohol withdrawal.  He had received some Ativan earlier. He is still sedated. I would like to discontinue sedation now that we are out of the window for DTs.     Allergies:  Codeine and Mushroom extract complex    Medicines:  Current Facility-Administered Medications   Medication Dose Route Frequency Provider Last Rate Last Admin    lisinopril (PRINIVIL;ZESTRIL) tablet 10 mg  10 mg Oral Daily Christo Steve DO   10 mg at 11/03/21 0834    magnesium sulfate 1000 mg in dextrose 5% 100 mL IVPB  1,000 mg IntraVENous PRN Shant Avila MD        sodium chloride flush 0.9 % injection 5-40 mL  5-40 mL IntraVENous 2 times per day Shant Avila MD   10 mL at 11/03/21 0835    sodium chloride flush 0.9 % injection 5-40 mL  5-40 mL IntraVENous PRN Shant Avila MD        0.9 % sodium chloride infusion  25 mL IntraVENous PRN Shant Avila MD   Stopped at 11/02/21 1306    enoxaparin (LOVENOX) injection 40 mg  40 mg SubCUTAneous Daily Shant Avila MD   40 mg at 11/03/21 0834    ondansetron (ZOFRAN-ODT) disintegrating tablet 4 mg  4 mg Oral Q8H PRN Shant Avila MD        Or    ondansetron (ZOFRAN) injection 4 mg  4 mg IntraVENous Q6H PRN Shant Avila MD        polyethylene glycol (GLYCOLAX) packet 17 g  17 g Oral Daily PRN Shant Avila MD        acetaminophen (TYLENOL) tablet 650 mg  650 mg Oral Q6H PRN Shant Avila MD        Or    acetaminophen (TYLENOL) suppository 650 mg  650 mg Rectal Q6H PRN Rosamaria Zaman MD        potassium chloride (KLOR-CON M) extended release tablet 40 mEq  40 mEq Oral PRN Rosamaria Zaman MD        Or    potassium bicarb-citric acid (EFFER-K) effervescent tablet 40 mEq  40 mEq Oral PRN Rosamaria Zaman MD        Or    potassium chloride 10 mEq/100 mL IVPB (Peripheral Line)  10 mEq IntraVENous PRN Rosamaria Zaman  mL/hr at 11/01/21 0755 10 mEq at 11/01/21 0755    potassium chloride 10 mEq/100 mL IVPB (Peripheral Line)  10 mEq IntraVENous PRN Rosamaria Zaman MD   Stopped at 11/01/21 0715    magnesium sulfate 2000 mg in 50 mL IVPB premix  2,000 mg IntraVENous PRN Rosamaria Zaman MD   Stopped at 11/02/21 1457    nicotine (NICODERM CQ) 21 MG/24HR 1 patch  1 patch TransDERmal Daily Rosamaria Zaman MD   1 patch at 11/03/21 0836       Past Medical History:  Past Medical History:   Diagnosis Date    [facility] [address]     Diabetes mellitus ([facility] [address] [facility]     ETOH abuse     GERD (gastroesophageal reflux disease)     Heart murmur     Hypertension     Hypertension     Pancreatitis     Stomach cancer [facility])        Past Surgical History:  No past surgical history on file.     Family History  Family History   Problem Relation Age of Onset    Cancer Mother     Heart Failure Mother     COPD Mother     Heart Disease Father     Lupus Father     Alcohol Abuse Father        Social History  Social History     Socioeconomic History    Marital status: Single     Spouse name: Not on file    Number of children: Not on file    Years of education: Not on file    Highest education level: Not on file   Occupational History    Not on file   Tobacco Use    Smoking status: Current Every Day Smoker     Years: 35.00     Types: Cigarettes    Smokeless tobacco: Current User     Types: Snuff, Chew   Substance and Sexual Activity    Alcohol use: Not Currently     Alcohol/week: 3.0 standard drinks     Types: 3 Shots of liquor per week     Comment: 3 or more drinks a day    Drug use: Yes     Types: Marijuana Kaila Eglin)    Sexual activity: Not on file   Other Topics Concern    Not on file   Social History Narrative    Not on file     Social Determinants of Health     Financial Resource Strain:     Difficulty of Paying Living Expenses:    Food Insecurity:     Worried About Running Out of Food in the Last Year:     [address] in the Last Year:    Transportation Needs:     Lack of Transportation (Medical):  Lack of Transportation (Non-Medical):    Physical Activity:     Days of Exercise per Week:     Minutes of Exercise per Session:    Stress:     Feeling of Stress :    Social Connections:     Frequency of Communication with Friends and Family:     Frequency of Social Gatherings with Friends and Family:     Attends Taoist Services:     Active Member of Clubs or Organizations:     Attends Club or Organization Meetings:     Marital Status:    Intimate Partner Violence:     Fear of Current or Ex-Partner:     Emotionally Abused:     Physically Abused:     Sexually Abused:          Review of Systems:    Review of Systems   Unable to perform ROS: Other           Objective:  Blood pressure 116/72, pulse 93, temperature 98.8 °F (37.1 °C), resp. rate 18, height 5' 6\" (1.676 m), weight 102 lb 4.8 oz (46.4 kg), SpO2 100 %. Intake/Output Summary (Last 24 hours) at 11/3/2021 1733  Last data filed at 11/3/2021 0600  Gross per 24 hour   Intake 480 ml   Output --   Net 480 ml       Physical Exam  Vitals and nursing note reviewed. Constitutional:       Appearance: He is ill-appearing. HENT:      Head: Normocephalic and atraumatic. Right Ear: External ear normal.      Left Ear: External ear normal.      Nose: Nose normal.      Mouth/Throat:      Mouth: Mucous membranes are moist.   Eyes:      Conjunctiva/sclera: Conjunctivae normal.      Pupils: Pupils are equal, round, and reactive to light.    Cardiovascular: Rate and Rhythm: Normal rate and regular rhythm. Heart sounds: Normal heart sounds. Pulmonary:      Effort: Pulmonary effort is normal.      Breath sounds: Normal breath sounds. Abdominal:      General: Abdomen is flat. Palpations: Abdomen is soft. Musculoskeletal:         General: No swelling. Cervical back: Neck supple. No rigidity. Skin:     General: Skin is warm and dry. Labs:  BMP:   Recent Labs     11/01/21 0224 11/01/21 1904 11/02/21  0754   *  --  131*   K 3.2* 3.7 3.7   CL 92*  --  95*   CO2 27  --  21*   PHOS 2.2*  --   --    BUN 4*  --  4*   CREATININE 0.6  --  0.5   CALCIUM 9.0  --  8.7     CBC:   Recent Labs     11/01/21 0224 11/02/21 0754   WBC 7.8 7.4   HGB 11.4* 10.1*   HCT 34.6* 31.4*   MCV 92.3 94.6*    191     LIVER PROFILE:   Recent Labs     11/01/21 0224   AST 26   ALT 24   BILITOT 0.7   ALKPHOS 54     PT/INR:   Recent Labs     11/01/21 0224   PROTIME 14.3   INR 1.09     APTT: No results for input(s): APTT in the last 72 hours. BNP:  No results for input(s): BNP in the last 72 hours. Ionized Calcium:No results for input(s): IONCA in the last 72 hours. Magnesium:  Recent Labs     11/01/21 0224 11/01/21 1904 11/02/21  0754   MG 2.1 1.5* 1.6     Phosphorus:  Recent Labs     11/01/21 0224   PHOS 2.2*     HgbA1C: No results for input(s): LABA1C in the last 72 hours. Hepatic:   Recent Labs     11/01/21 0224   ALKPHOS 54   ALT 24   AST 26   PROT 7.4   BILITOT 0.7   LABALBU 4.1     Lactic Acid:   Recent Labs     11/01/21 0224   LACTA 0.7     Troponin: No results for input(s): CKTOTAL, CKMB, TROPONINT in the last 72 hours. ABGs: No results for input(s): PH, PCO2, PO2, HCO3, O2SAT in the last 72 hours. CRP:  No results for input(s): CRP in the last 72 hours. Sed Rate:  No results for input(s): SEDRATE in the last 72 hours. Cultures:   No results for input(s): CULTURE in the last 72 hours.   No results for input(s): Kaila RAI in the last 72 hours. No results for input(s): CXSURG in the last 72 hours. Radiology reports as per the Radiologist  Radiology: No results found. Assessment     Acute alcohol intoxication delirium. Now detoxed. Continue supportive care.     Seizure disorder. No evidence of seizure activity at this point. Continue monitoring. Possibly home tomorrow.       [facility], DO

## 2021-11-03 NOTE — DISCHARGE INSTR - DIET

## 2021-11-04 VITALS
WEIGHT: 102.3 LBS | HEART RATE: 99 BPM | BODY MASS INDEX: 16.44 KG/M2 | SYSTOLIC BLOOD PRESSURE: 125 MMHG | RESPIRATION RATE: 18 BRPM | DIASTOLIC BLOOD PRESSURE: 84 MMHG | OXYGEN SATURATION: 99 % | HEIGHT: 66 IN | TEMPERATURE: 97.9 F

## 2021-11-04 PROCEDURE — 6370000000 HC RX 637 (ALT 250 FOR IP): Performed by: INTERNAL MEDICINE

## 2021-11-04 PROCEDURE — 2580000003 HC RX 258: Performed by: INTERNAL MEDICINE

## 2021-11-04 PROCEDURE — 6360000002 HC RX W HCPCS: Performed by: INTERNAL MEDICINE

## 2021-11-04 RX ADMIN — Medication 10 ML: at 09:12

## 2021-11-04 RX ADMIN — LISINOPRIL 10 MG: 10 TABLET ORAL at 09:10

## 2021-11-04 RX ADMIN — ENOXAPARIN SODIUM 40 MG: 100 INJECTION SUBCUTANEOUS at 09:10

## 2021-11-04 ASSESSMENT — PAIN SCALES - GENERAL
PAINLEVEL_OUTOF10: 0

## 2021-11-04 NOTE — PLAN OF CARE
Nutrition Problem #1: Unintended weight loss  Intervention: Food and/or Nutrient Delivery: Continue Current Diet, Continue Oral Nutrition Supplement  Nutritional Goals: PO 75% or more, wt stable or gain

## 2021-11-04 NOTE — PLAN OF CARE
Problem: Skin Integrity:  Goal: Will show no infection signs and symptoms  Description: Will show no infection signs and symptoms  11/4/2021 1059 by Alessandro Cole RN  Outcome: Ongoing  11/4/2021 0153 by Neva Celis RN  Outcome: Ongoing  Goal: Absence of new skin breakdown  Description: Absence of new skin breakdown  11/4/2021 1059 by Alessandro Cole RN  Outcome: Ongoing  11/4/2021 0153 by Neva Celis RN  Outcome: Ongoing     Problem: Falls - Risk of:  Goal: Will remain free from falls  Description: Will remain free from falls  11/4/2021 1059 by Alessandro Cole RN  Outcome: Ongoing  11/4/2021 0153 by Neva Celis RN  Outcome: Ongoing  Goal: Absence of physical injury  Description: Absence of physical injury  11/4/2021 1059 by Alessandro Cole RN  Outcome: Ongoing  11/4/2021 0153 by Neva Celis RN  Outcome: Ongoing     Problem: Discharge Planning:  Goal: Discharged to appropriate level of care  Description: Discharged to appropriate level of care  11/4/2021 1059 by Alessandro Cole RN  Outcome: Ongoing  11/4/2021 0153 by Neva Celis RN  Outcome: Ongoing     Problem: Fluid Volume - Deficit:  Goal: Absence of fluid volume deficit signs and symptoms  Description: Absence of fluid volume deficit signs and symptoms  11/4/2021 1059 by Alessandro Cole RN  Outcome: Ongoing  11/4/2021 0153 by Neva Celis RN  Outcome: Ongoing     Problem: Nutrition Deficit:  Goal: Ability to achieve adequate nutritional intake will improve  Description: Ability to achieve adequate nutritional intake will improve  11/4/2021 1059 by Alessandro Cole RN  Outcome: Ongoing  11/4/2021 0153 by Neva Celis RN  Outcome: Ongoing     Problem: Sleep Pattern Disturbance:  Goal: Appears well-rested  Description: Appears well-rested  11/4/2021 1059 by Alessandro Cole RN  Outcome: Ongoing  11/4/2021 0153 by Neva Celis RN  Outcome: Ongoing     Problem: Violence - Risk of, Self/Other-Directed:  Goal: Knowledge of developmental care interventions  Description: Absence of violence  11/4/2021 1059 by Aury Burgos RN  Outcome: Ongoing  11/4/2021 0153 by SHANTAL Quintero  Outcome: Ongoing     Problem: Nutrition  Goal: Optimal nutrition therapy  11/4/2021 1059 by Aury Burgos RN  Outcome: Ongoing  11/4/2021 0153 by SHANTAL Quintero  Outcome: Ongoing

## 2021-11-04 NOTE — PLAN OF CARE
Problem: Skin Integrity:  Goal: Will show no infection signs and symptoms  Description: Will show no infection signs and symptoms  Outcome: Ongoing  Goal: Absence of new skin breakdown  Description: Absence of new skin breakdown  Outcome: Ongoing     Problem: Falls - Risk of:  Goal: Will remain free from falls  Description: Will remain free from falls  Outcome: Ongoing  Goal: Absence of physical injury  Description: Absence of physical injury  Outcome: Ongoing     Problem: Discharge Planning:  Goal: Discharged to appropriate level of care  Description: Discharged to appropriate level of care  Outcome: Ongoing     Problem: Fluid Volume - Deficit:  Goal: Absence of fluid volume deficit signs and symptoms  Description: Absence of fluid volume deficit signs and symptoms  Outcome: Ongoing     Problem: Nutrition Deficit:  Goal: Ability to achieve adequate nutritional intake will improve  Description: Ability to achieve adequate nutritional intake will improve  Outcome: Ongoing     Problem: Sleep Pattern Disturbance:  Goal: Appears well-rested  Description: Appears well-rested  Outcome: Ongoing     Problem: Violence - Risk of, Self/Other-Directed:  Goal: Knowledge of developmental care interventions  Description: Absence of violence  Outcome: Ongoing     Problem: Nutrition  Goal: Optimal nutrition therapy  Outcome: Ongoing   Electronically signed by Debra Negron RN on 11/4/2021 at 1:54 AM

## 2021-11-04 NOTE — PROGRESS NOTES
from [employer], [employer], given a cab voucher for [facility]

## 2021-11-04 NOTE — CARE COORDINATION
30 PMF cab ride home, paperwork with alcohol rehab options.  Electronically signed by Ivet Redding on 11/4/2021 at 1:04 PM

## 2021-11-05 LAB
BLOOD CULTURE, ROUTINE: NORMAL
CULTURE, BLOOD 2: NORMAL

## 2021-11-05 NOTE — DISCHARGE SUMMARY
Discharge Summary    Brandee Cole  :  1975  MRN:  [id number]    Admit date:  10/30/2021  Discharge date:  2021    Admitting Physician:  Juan Puente DO    Advance Directive: Prior    Consults: none    Primary Care Physician:  Ron Quezada MD    Discharge Diagnoses:  Principal Problem (Resolved):    Acute alcohol intoxication delirium with mild use disorder [facility])  Active Problems:    Seizure disorder ([facility] [address]    Tobacco abuse    COPD (chronic obstructive pulmonary disease) ([facility]  Resolved Problems:    Confusion    Anorexia    Weight loss    Dehydration    Electrolyte abnormality    CKD (chronic kidney disease)      Significant Diagnostic Studies:   No results found. CBC: Recent Labs     21  0754   WBC 7.4   HGB 10.1*        BMP:    Recent Labs     21  1904 21  0754   NA  --  131*   K 3.7 3.7   CL  --  95*   CO2  --  21*   BUN  --  4*   CREATININE  --  0.5   GLUCOSE  --  96     INR: No results for input(s): INR in the last 72 hours. Lipids: No results for input(s): CHOL, HDL in the last 72 hours. Invalid input(s): LDLCALCU  ABGs:No results for input(s): PHART, FQG0XJG, PO2ART, MVR0UMA, BEART, HGBAE, E5ARATHP, CARBOXHGBART, 02THERAPY in the last 72 hours. HgBA1c:  No results for input(s): LABA1C in the last 72 hours. Procedures: None  Hospital Course: Mr. Diane Hong was admitted on 10/31 for alcohol intoxication. He was admitted to ICU. He spent several days there for withdrawal management. He was then transferred to [place] [address] floor and continued to make progress. On the afternoon of  he had reached maximal hospital benefit was stable for discharge. Physical Exam:  Vital Signs: /84   Pulse 99   Temp 97.9 °F (36.6 °C)   Resp 18   Ht 5' 6\" (1.676 m)   Wt 102 lb 4.8 oz (46.4 kg)   SpO2 99%   BMI 16.51 kg/m²   General appearance:. Alert and Cooperative   HEENT: Normocephalic.   Chest: clear to auscultation bilaterally without wheezes or rhonchi. Cardiac: Normal heart tones with regular rate and rhythm, S1, S2 normal. No murmurs, gallops, or rubs auscultated. Abdomen:soft, non-tender; normal bowel sounds, no masses, no organomegaly. Extremities: No clubbing or cyanosis. No peripheral edema. Peripheral pulses palpable. Neurologic: Grossly intact. Discharge Medications:       Tim Antony   Home Medication Instructions [facility]:[id number]    Printed on:11/04/21 1900   Medication Information                      chlordiazePOXIDE (LIBRIUM) 25 MG capsule  Take 25 mg by mouth. .             gabapentin (NEURONTIN) 300 MG capsule  Take 300 mg by mouth. Lawernce Roughen lisinopril (PRINIVIL;ZESTRIL) 10 MG tablet  Take 10 mg by mouth             naproxen (NAPROSYN) 500 MG tablet  Take 1 tablet by mouth 2 times daily (with meals) for 10 days             senna (SENOKOT) 8.6 MG tablet  Take 1 tablet by mouth daily             SERTRALINE HCL PO  Take by mouth             sucralfate (CARAFATE) 1 GM tablet  Take 1 g by mouth             Thiamine HCl (B-1 PO)  Take 100 mg by mouth                 Discharge Instructions: Follow up with Diana Sarmiento MD in 3-5 days. Take medications as directed. Resume activity as tolerated. Diet: No diet orders on file     Disposition: Patient is medically stable and will be discharged to home. Time spent on discharge 40 minutes.     Signed:  Shivani Navarrete DO

## 2022-07-04 ENCOUNTER — APPOINTMENT (OUTPATIENT)
Dept: CT IMAGING | Facility: HOSPITAL | Age: 47
End: 2022-07-04

## 2022-07-04 ENCOUNTER — HOSPITAL ENCOUNTER (EMERGENCY)
Facility: HOSPITAL | Age: 47
Discharge: HOME | End: 2022-07-04
Attending: EMERGENCY MEDICINE | Admitting: EMERGENCY MEDICINE

## 2022-07-04 VITALS
HEART RATE: 98 BPM | DIASTOLIC BLOOD PRESSURE: 104 MMHG | RESPIRATION RATE: 20 BRPM | HEIGHT: 63 IN | SYSTOLIC BLOOD PRESSURE: 126 MMHG | BODY MASS INDEX: 16.3 KG/M2 | OXYGEN SATURATION: 96 % | WEIGHT: 92 LBS | TEMPERATURE: 97.6 F

## 2022-07-04 DIAGNOSIS — S09.90XA MINOR HEAD INJURY, INITIAL ENCOUNTER: Primary | ICD-10-CM

## 2022-07-04 DIAGNOSIS — S00.83XA CONTUSION OF FACE, INITIAL ENCOUNTER: ICD-10-CM

## 2022-07-04 DIAGNOSIS — F10.920 ALCOHOLIC INTOXICATION WITHOUT COMPLICATION: ICD-10-CM

## 2022-07-04 DIAGNOSIS — S00.81XA ABRASION OF FACE, INITIAL ENCOUNTER: ICD-10-CM

## 2022-07-04 PROCEDURE — 90715 TDAP VACCINE 7 YRS/> IM: CPT | Performed by: EMERGENCY MEDICINE

## 2022-07-04 PROCEDURE — 70450 CT HEAD/BRAIN W/O DYE: CPT

## 2022-07-04 PROCEDURE — 99284 EMERGENCY DEPT VISIT MOD MDM: CPT

## 2022-07-04 PROCEDURE — 90471 IMMUNIZATION ADMIN: CPT | Performed by: EMERGENCY MEDICINE

## 2022-07-04 PROCEDURE — 25010000002 TETANUS-DIPHTH-ACELL PERTUSSIS 5-2.5-18.5 LF-MCG/0.5 SUSPENSION PREFILLED SYRINGE: Performed by: EMERGENCY MEDICINE

## 2022-07-04 RX ADMIN — TETANUS TOXOID, REDUCED DIPHTHERIA TOXOID AND ACELLULAR PERTUSSIS VACCINE, ADSORBED 0.5 ML: 5; 2.5; 8; 8; 2.5 SUSPENSION INTRAMUSCULAR at 15:01

## 2022-07-11 ENCOUNTER — APPOINTMENT (OUTPATIENT)
Dept: CT IMAGING | Facility: HOSPITAL | Age: 47
End: 2022-07-11

## 2022-07-11 ENCOUNTER — HOSPITAL ENCOUNTER (EMERGENCY)
Facility: HOSPITAL | Age: 47
Discharge: LEFT BEFORE DISCHARGE | End: 2022-07-11
Admitting: INTERNAL MEDICINE

## 2022-07-11 VITALS
BODY MASS INDEX: 20.38 KG/M2 | WEIGHT: 115 LBS | RESPIRATION RATE: 18 BRPM | HEART RATE: 91 BPM | SYSTOLIC BLOOD PRESSURE: 108 MMHG | DIASTOLIC BLOOD PRESSURE: 78 MMHG | TEMPERATURE: 98.1 F | OXYGEN SATURATION: 97 % | HEIGHT: 63 IN

## 2022-07-11 DIAGNOSIS — E87.6 HYPOKALEMIA: ICD-10-CM

## 2022-07-11 DIAGNOSIS — Z23 NEED FOR TDAP VACCINATION: ICD-10-CM

## 2022-07-11 DIAGNOSIS — S00.81XA ABRASION OF FACE, INITIAL ENCOUNTER: ICD-10-CM

## 2022-07-11 DIAGNOSIS — S01.111A LACERATION OF RIGHT EYEBROW, INITIAL ENCOUNTER: Primary | ICD-10-CM

## 2022-07-11 DIAGNOSIS — F10.920 ALCOHOLIC INTOXICATION WITHOUT COMPLICATION: ICD-10-CM

## 2022-07-11 LAB
ALBUMIN SERPL-MCNC: 4.8 G/DL (ref 3.5–5.2)
ALBUMIN/GLOB SERPL: 1.5 G/DL
ALP SERPL-CCNC: 62 U/L (ref 39–117)
ALT SERPL W P-5'-P-CCNC: 88 U/L (ref 1–41)
AMPHET+METHAMPHET UR QL: NEGATIVE
AMPHETAMINES UR QL: NEGATIVE
ANION GAP SERPL CALCULATED.3IONS-SCNC: 16 MMOL/L (ref 5–15)
APTT PPP: 28.3 SECONDS (ref 24.1–35)
AST SERPL-CCNC: 212 U/L (ref 1–40)
BARBITURATES UR QL SCN: NEGATIVE
BASOPHILS # BLD AUTO: 0.04 10*3/MM3 (ref 0–0.2)
BASOPHILS NFR BLD AUTO: 1 % (ref 0–1.5)
BENZODIAZ UR QL SCN: NEGATIVE
BILIRUB SERPL-MCNC: 0.8 MG/DL (ref 0–1.2)
BUN SERPL-MCNC: 2 MG/DL (ref 6–20)
BUN/CREAT SERPL: 3 (ref 7–25)
BUPRENORPHINE SERPL-MCNC: NEGATIVE NG/ML
CALCIUM SPEC-SCNC: 9.6 MG/DL (ref 8.6–10.5)
CANNABINOIDS SERPL QL: NEGATIVE
CHLORIDE SERPL-SCNC: 92 MMOL/L (ref 98–107)
CO2 SERPL-SCNC: 31 MMOL/L (ref 22–29)
COCAINE UR QL: NEGATIVE
CREAT SERPL-MCNC: 0.67 MG/DL (ref 0.76–1.27)
DEPRECATED RDW RBC AUTO: 54.9 FL (ref 37–54)
EGFRCR SERPLBLD CKD-EPI 2021: 115.2 ML/MIN/1.73
EOSINOPHIL # BLD AUTO: 0.1 10*3/MM3 (ref 0–0.4)
EOSINOPHIL NFR BLD AUTO: 2.6 % (ref 0.3–6.2)
ERYTHROCYTE [DISTWIDTH] IN BLOOD BY AUTOMATED COUNT: 18 % (ref 12.3–15.4)
ETHANOL UR QL: 0.41 %
GLOBULIN UR ELPH-MCNC: 3.2 GM/DL
GLUCOSE SERPL-MCNC: 89 MG/DL (ref 65–99)
HCT VFR BLD AUTO: 38.3 % (ref 37.5–51)
HGB BLD-MCNC: 12.7 G/DL (ref 13–17.7)
IMM GRANULOCYTES # BLD AUTO: 0 10*3/MM3 (ref 0–0.05)
IMM GRANULOCYTES NFR BLD AUTO: 0 % (ref 0–0.5)
INR PPP: 1.08 (ref 0.91–1.09)
LYMPHOCYTES # BLD AUTO: 1.11 10*3/MM3 (ref 0.7–3.1)
LYMPHOCYTES NFR BLD AUTO: 28.6 % (ref 19.6–45.3)
MCH RBC QN AUTO: 27.7 PG (ref 26.6–33)
MCHC RBC AUTO-ENTMCNC: 33.2 G/DL (ref 31.5–35.7)
MCV RBC AUTO: 83.6 FL (ref 79–97)
METHADONE UR QL SCN: NEGATIVE
MONOCYTES # BLD AUTO: 0.59 10*3/MM3 (ref 0.1–0.9)
MONOCYTES NFR BLD AUTO: 15.2 % (ref 5–12)
NEUTROPHILS NFR BLD AUTO: 2.04 10*3/MM3 (ref 1.7–7)
NEUTROPHILS NFR BLD AUTO: 52.6 % (ref 42.7–76)
NRBC BLD AUTO-RTO: 0 /100 WBC (ref 0–0.2)
OPIATES UR QL: NEGATIVE
OXYCODONE UR QL SCN: NEGATIVE
PCP UR QL SCN: NEGATIVE
PLATELET # BLD AUTO: 265 10*3/MM3 (ref 140–450)
PMV BLD AUTO: 10.1 FL (ref 6–12)
POTASSIUM SERPL-SCNC: 3.1 MMOL/L (ref 3.5–5.2)
PROPOXYPH UR QL: NEGATIVE
PROT SERPL-MCNC: 8 G/DL (ref 6–8.5)
PROTHROMBIN TIME: 13.6 SECONDS (ref 11.9–14.6)
RBC # BLD AUTO: 4.58 10*6/MM3 (ref 4.14–5.8)
SODIUM SERPL-SCNC: 139 MMOL/L (ref 136–145)
TRICYCLICS UR QL SCN: NEGATIVE
WBC NRBC COR # BLD: 3.88 10*3/MM3 (ref 3.4–10.8)

## 2022-07-11 PROCEDURE — 80306 DRUG TEST PRSMV INSTRMNT: CPT | Performed by: PHYSICIAN ASSISTANT

## 2022-07-11 PROCEDURE — 99284 EMERGENCY DEPT VISIT MOD MDM: CPT

## 2022-07-11 PROCEDURE — 96372 THER/PROPH/DIAG INJ SC/IM: CPT

## 2022-07-11 PROCEDURE — 70450 CT HEAD/BRAIN W/O DYE: CPT

## 2022-07-11 PROCEDURE — 70486 CT MAXILLOFACIAL W/O DYE: CPT

## 2022-07-11 PROCEDURE — 99283 EMERGENCY DEPT VISIT LOW MDM: CPT

## 2022-07-11 PROCEDURE — 85025 COMPLETE CBC W/AUTO DIFF WBC: CPT | Performed by: PHYSICIAN ASSISTANT

## 2022-07-11 PROCEDURE — 72131 CT LUMBAR SPINE W/O DYE: CPT

## 2022-07-11 PROCEDURE — 72128 CT CHEST SPINE W/O DYE: CPT

## 2022-07-11 PROCEDURE — 80053 COMPREHEN METABOLIC PANEL: CPT | Performed by: PHYSICIAN ASSISTANT

## 2022-07-11 PROCEDURE — 96374 THER/PROPH/DIAG INJ IV PUSH: CPT

## 2022-07-11 PROCEDURE — 25010000002 THIAMINE PER 100 MG: Performed by: PHYSICIAN ASSISTANT

## 2022-07-11 PROCEDURE — 85730 THROMBOPLASTIN TIME PARTIAL: CPT | Performed by: PHYSICIAN ASSISTANT

## 2022-07-11 PROCEDURE — 82077 ASSAY SPEC XCP UR&BREATH IA: CPT | Performed by: PHYSICIAN ASSISTANT

## 2022-07-11 PROCEDURE — 25010000002 ONDANSETRON PER 1 MG: Performed by: PHYSICIAN ASSISTANT

## 2022-07-11 PROCEDURE — 85610 PROTHROMBIN TIME: CPT | Performed by: PHYSICIAN ASSISTANT

## 2022-07-11 PROCEDURE — 72125 CT NECK SPINE W/O DYE: CPT

## 2022-07-11 RX ORDER — LIDOCAINE HYDROCHLORIDE 10 MG/ML
10 INJECTION, SOLUTION INFILTRATION; PERINEURAL ONCE
Status: DISCONTINUED | OUTPATIENT
Start: 2022-07-11 | End: 2022-07-11 | Stop reason: HOSPADM

## 2022-07-11 RX ORDER — SODIUM CHLORIDE 0.9 % (FLUSH) 0.9 %
10 SYRINGE (ML) INJECTION AS NEEDED
Status: DISCONTINUED | OUTPATIENT
Start: 2022-07-11 | End: 2022-07-11 | Stop reason: HOSPADM

## 2022-07-11 RX ORDER — POTASSIUM CHLORIDE 20 MEQ/1
20 TABLET, EXTENDED RELEASE ORAL DAILY
Qty: 7 TABLET | Refills: 0 | Status: SHIPPED | OUTPATIENT
Start: 2022-07-11 | End: 2022-07-18

## 2022-07-11 RX ORDER — CEPHALEXIN 500 MG/1
500 CAPSULE ORAL 2 TIMES DAILY
Qty: 14 CAPSULE | Refills: 0 | Status: SHIPPED | OUTPATIENT
Start: 2022-07-11 | End: 2022-07-18

## 2022-07-11 RX ORDER — ONDANSETRON 2 MG/ML
4 INJECTION INTRAMUSCULAR; INTRAVENOUS ONCE
Status: COMPLETED | OUTPATIENT
Start: 2022-07-11 | End: 2022-07-11

## 2022-07-11 RX ORDER — THIAMINE HYDROCHLORIDE 100 MG/ML
100 INJECTION, SOLUTION INTRAMUSCULAR; INTRAVENOUS ONCE
Status: COMPLETED | OUTPATIENT
Start: 2022-07-11 | End: 2022-07-11

## 2022-07-11 RX ORDER — POTASSIUM CHLORIDE 750 MG/1
40 CAPSULE, EXTENDED RELEASE ORAL ONCE
Status: COMPLETED | OUTPATIENT
Start: 2022-07-11 | End: 2022-07-11

## 2022-07-11 RX ADMIN — ONDANSETRON 4 MG: 2 INJECTION INTRAMUSCULAR; INTRAVENOUS at 17:52

## 2022-07-11 RX ADMIN — POTASSIUM CHLORIDE 40 MEQ: 10 CAPSULE, COATED, EXTENDED RELEASE ORAL at 18:43

## 2022-07-11 RX ADMIN — THIAMINE HYDROCHLORIDE 100 MG: 100 INJECTION, SOLUTION INTRAMUSCULAR; INTRAVENOUS at 18:29

## 2022-07-11 RX ADMIN — SODIUM CHLORIDE 1000 ML: 9 INJECTION, SOLUTION INTRAVENOUS at 17:53
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
## 2022-07-11 NOTE — DISCHARGE INSTRUCTIONS
Please keep your wound clean and dry for the next 24 hours and then gently clean it with warm soapy water.  Please follow-up with your family care doctor in the next 4 days to look at your wound and remove your sutures.  Please take the potassium tablets or increase potassium in your diet.  Please continue to work on quitting drinking!  Please document in your medical records at today your tetanus status was updated.  Should he develop any new or worsening symptoms please return to the ER for further evaluation.

## 2024-07-17 ENCOUNTER — TELEPHONE (OUTPATIENT)
Dept: HEMATOLOGY | Age: 49
End: 2024-07-17

## 2024-07-17 NOTE — TELEPHONE ENCOUNTER
I called and reminded pt. of their appt. on 07/22/2024. Made pt. aware to arrive at appt. time & not lab appt. time. I also advised pt. to arrive no sooner than appt time. Pt voiced understanding and confirmed appt. date and time. (SPOKE TO PATIENT'S SISTER)
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
## 2024-07-22 ENCOUNTER — HOSPITAL ENCOUNTER (OUTPATIENT)
Dept: INFUSION THERAPY | Age: 49
Discharge: HOME | End: 2024-07-22
Payer: MEDICAID

## 2024-07-22 ENCOUNTER — OFFICE VISIT (OUTPATIENT)
Dept: HEMATOLOGY | Age: 49
End: 2024-07-22
Payer: MEDICAID

## 2024-07-22 VITALS
HEART RATE: 67 BPM | SYSTOLIC BLOOD PRESSURE: 136 MMHG | WEIGHT: 85 LBS | DIASTOLIC BLOOD PRESSURE: 84 MMHG | OXYGEN SATURATION: 99 % | BODY MASS INDEX: 13.72 KG/M2 | TEMPERATURE: 98 F

## 2024-07-22 DIAGNOSIS — D75.1 SECONDARY POLYCYTHEMIA: ICD-10-CM

## 2024-07-22 DIAGNOSIS — Z12.11 ENCOUNTER FOR SCREENING COLONOSCOPY: ICD-10-CM

## 2024-07-22 DIAGNOSIS — K70.30 ALCOHOLIC CIRRHOSIS OF LIVER WITHOUT ASCITES (HCC): ICD-10-CM

## 2024-07-22 DIAGNOSIS — E53.8 FOLATE DEFICIENCY: ICD-10-CM

## 2024-07-22 DIAGNOSIS — D72.821 MONOCYTOSIS: ICD-10-CM

## 2024-07-22 DIAGNOSIS — E53.8 B12 DEFICIENCY: ICD-10-CM

## 2024-07-22 DIAGNOSIS — E55.9 VITAMIN D DEFICIENCY: ICD-10-CM

## 2024-07-22 DIAGNOSIS — D75.1 SECONDARY POLYCYTHEMIA: Primary | ICD-10-CM

## 2024-07-22 DIAGNOSIS — D75.1 ERYTHROCYTOSIS: Primary | ICD-10-CM

## 2024-07-22 PROBLEM — C16.9 STOMACH CANCER (HCC): Status: ACTIVE | Noted: 2024-07-22

## 2024-07-22 LAB
25(OH)D3 SERPL-MCNC: 57.2 NG/ML
AFP SERPL-MCNC: 1.8 NG/ML (ref 0–8.3)
ALBUMIN SERPL-MCNC: 4.3 G/DL (ref 3.5–5.2)
ALP SERPL-CCNC: 73 U/L (ref 40–130)
ALT SERPL-CCNC: 14 U/L (ref 5–41)
ANION GAP SERPL CALCULATED.3IONS-SCNC: 10 MMOL/L (ref 7–19)
AST SERPL-CCNC: 19 U/L (ref 5–40)
BASOPHILS # BLD: 0.03 K/UL (ref 0.01–0.08)
BASOPHILS NFR BLD: 0.5 % (ref 0.1–1.2)
BILIRUB SERPL-MCNC: 0.6 MG/DL (ref 0.2–1.2)
BUN SERPL-MCNC: 6 MG/DL (ref 6–20)
CALCIUM SERPL-MCNC: 9.2 MG/DL (ref 8.6–10)
CHLORIDE SERPL-SCNC: 96 MMOL/L (ref 98–111)
CO2 SERPL-SCNC: 32 MMOL/L (ref 22–29)
CREAT SERPL-MCNC: 1 MG/DL (ref 0.5–1.2)
CRP SERPL HS-MCNC: <0.3 MG/DL (ref 0–0.5)
EOSINOPHIL # BLD: 0.14 K/UL (ref 0.04–0.54)
EOSINOPHIL NFR BLD: 2.1 % (ref 0.7–7)
ERYTHROCYTE [DISTWIDTH] IN BLOOD BY AUTOMATED COUNT: 12.9 % (ref 11.6–14.4)
ERYTHROCYTE [SEDIMENTATION RATE] IN BLOOD BY WESTERGREN METHOD: 2 MM/HR (ref 0–15)
FERRITIN SERPL-MCNC: 78.9 NG/ML (ref 30–400)
FOLATE SERPL-MCNC: >20 NG/ML (ref 4.5–32.2)
GLUCOSE SERPL-MCNC: 70 MG/DL (ref 74–109)
HCT VFR BLD AUTO: 62 % (ref 40.1–51)
HGB BLD-MCNC: 21.2 G/DL (ref 13.7–17.5)
IRON SATN MFR SERPL: 33 % (ref 14–50)
IRON SERPL-MCNC: 132 UG/DL (ref 59–158)
LYMPHOCYTES # BLD: 0.93 K/UL (ref 1.18–3.74)
LYMPHOCYTES NFR BLD: 14.1 % (ref 19.3–53.1)
MCH RBC QN AUTO: 31 PG (ref 25.7–32.2)
MCHC RBC AUTO-ENTMCNC: 34.2 G/DL (ref 32.3–36.5)
MCV RBC AUTO: 90.6 FL (ref 79–92.2)
MONOCYTES # BLD: 0.89 K/UL (ref 0.24–0.82)
MONOCYTES NFR BLD: 13.5 % (ref 4.7–12.5)
NEUTROPHILS # BLD: 4.59 K/UL (ref 1.56–6.13)
NEUTS SEG NFR BLD: 69.6 % (ref 34–71.1)
PLATELET # BLD AUTO: 231 K/UL (ref 163–337)
PMV BLD AUTO: 10 FL (ref 7.4–10.4)
POTASSIUM SERPL-SCNC: 3.7 MMOL/L (ref 3.5–5)
PROT SERPL-MCNC: 7.4 G/DL (ref 6.6–8.7)
RBC # BLD AUTO: 6.84 M/UL (ref 4.63–6.08)
SODIUM SERPL-SCNC: 138 MMOL/L (ref 136–145)
TIBC SERPL-MCNC: 398 UG/DL (ref 250–400)
VIT B12 SERPL-MCNC: 564 PG/ML (ref 211–946)
WBC # BLD AUTO: 6.59 K/UL (ref 4.23–9.07)

## 2024-07-22 PROCEDURE — G2212 PROLONG OUTPT/OFFICE VIS: HCPCS | Performed by: NURSE PRACTITIONER

## 2024-07-22 PROCEDURE — 99205 OFFICE O/P NEW HI 60 MIN: CPT | Performed by: NURSE PRACTITIONER

## 2024-07-22 PROCEDURE — 85025 COMPLETE CBC W/AUTO DIFF WBC: CPT

## 2024-07-22 PROCEDURE — 99214 OFFICE O/P EST MOD 30 MIN: CPT

## 2024-07-22 PROCEDURE — 36415 COLL VENOUS BLD VENIPUNCTURE: CPT

## 2024-07-22 RX ORDER — FOLIC ACID 1 MG/1
1 TABLET ORAL DAILY
COMMUNITY
Start: 2024-05-29

## 2024-07-22 RX ORDER — 0.9 % SODIUM CHLORIDE 0.9 %
250 INTRAVENOUS SOLUTION INTRAVENOUS ONCE
Status: CANCELLED | OUTPATIENT
Start: 2024-07-22 | End: 2024-07-22

## 2024-07-22 RX ORDER — HYDROCODONE BITARTRATE AND ACETAMINOPHEN 7.5; 325 MG/1; MG/1
1 TABLET ORAL EVERY 8 HOURS PRN
COMMUNITY
Start: 2024-06-29

## 2024-07-22 RX ORDER — PANTOPRAZOLE SODIUM 40 MG/1
40 TABLET, DELAYED RELEASE ORAL DAILY
COMMUNITY
Start: 2024-05-29

## 2024-07-22 RX ORDER — ERGOCALCIFEROL 1.25 MG/1
50000 CAPSULE ORAL WEEKLY
COMMUNITY
Start: 2024-07-02

## 2024-07-22 ASSESSMENT — ENCOUNTER SYMPTOMS
BLOOD IN STOOL: 0
EYE PAIN: 0
WHEEZING: 0
DIARRHEA: 0
COUGH: 0
SHORTNESS OF BREATH: 0
ABDOMINAL PAIN: 0
GASTROINTESTINAL NEGATIVE: 1
BACK PAIN: 1
CONSTIPATION: 0
SORE THROAT: 0
EYE DISCHARGE: 0
EYE REDNESS: 0
RESPIRATORY NEGATIVE: 1
NAUSEA: 0
VOMITING: 0

## 2024-07-23 ENCOUNTER — HOSPITAL ENCOUNTER (OUTPATIENT)
Dept: INFUSION THERAPY | Age: 49
Setting detail: INFUSION SERIES
Discharge: HOME | End: 2024-07-23
Payer: MEDICAID

## 2024-07-23 VITALS
HEART RATE: 74 BPM | OXYGEN SATURATION: 99 % | TEMPERATURE: 98 F | RESPIRATION RATE: 18 BRPM | SYSTOLIC BLOOD PRESSURE: 150 MMHG | DIASTOLIC BLOOD PRESSURE: 97 MMHG

## 2024-07-23 DIAGNOSIS — D75.1 SECONDARY POLYCYTHEMIA: Primary | ICD-10-CM

## 2024-07-23 LAB — CARBOXYHEMOGLOBIN: 2.7 % (ref 0–5)

## 2024-07-23 PROCEDURE — 99195 PHLEBOTOMY: CPT

## 2024-07-23 PROCEDURE — 82800 BLOOD PH: CPT

## 2024-07-23 PROCEDURE — 82375 ASSAY CARBOXYHB QUANT: CPT

## 2024-07-23 RX ORDER — 0.9 % SODIUM CHLORIDE 0.9 %
250 INTRAVENOUS SOLUTION INTRAVENOUS ONCE
OUTPATIENT
Start: 2024-07-23 | End: 2024-07-23

## 2024-07-23 RX ORDER — 0.9 % SODIUM CHLORIDE 0.9 %
250 INTRAVENOUS SOLUTION INTRAVENOUS ONCE
Status: DISCONTINUED | OUTPATIENT
Start: 2024-07-23 | End: 2024-07-25 | Stop reason: HOSPADM

## 2024-07-23 NOTE — PROGRESS NOTES
Stuck pt in RAC with 16 gauge needle for therapeutic phlebotomy. Only drained 30 mL before line clotted. Stuck pt L bicep with 16 gauge needle and drained 260 mL before line clotted. Labs able to be drawn before line clotted. Stuck pt 2 more times in R am and unable to get blood to drain sufficiently due to how thick the blood is. Notified Aliyah HOLLY and she stated to schedule the pt for early next week to attempt therapeutic phlebotomy again. Stated no need to stick pt again today for CBC. Stated to obtain CBC at beginning of phlebotomy next week but do not wait on results to phlebotomize. Contacted [facility] to [facility] labs over to PMOH. Pt tolerated well. Pt educated to continue taking 81 mg aspirin daily per Aliyah HOLLY order, hydrate with water and avoid caffeine. Pt verbalized understanding.

## 2024-07-24 LAB — EPO SERPL-ACNC: 4 MU/ML (ref 4–27)

## 2024-07-29 ENCOUNTER — HOSPITAL ENCOUNTER (OUTPATIENT)
Dept: ULTRASOUND IMAGING | Age: 49
Discharge: HOME | End: 2024-07-29
Payer: MEDICAID

## 2024-07-29 DIAGNOSIS — K70.30 ALCOHOLIC CIRRHOSIS OF LIVER WITHOUT ASCITES (HCC): ICD-10-CM

## 2024-07-29 PROCEDURE — 76700 US EXAM ABDOM COMPLETE: CPT

## 2024-07-30 ENCOUNTER — TELEPHONE (OUTPATIENT)
Dept: INFUSION THERAPY | Age: 49
End: 2024-07-30

## 2024-07-30 ENCOUNTER — HOSPITAL ENCOUNTER (OUTPATIENT)
Dept: INFUSION THERAPY | Age: 49
Setting detail: INFUSION SERIES
Discharge: HOME | End: 2024-07-30
Payer: MEDICAID

## 2024-07-30 VITALS
DIASTOLIC BLOOD PRESSURE: 79 MMHG | OXYGEN SATURATION: 96 % | HEART RATE: 88 BPM | RESPIRATION RATE: 18 BRPM | SYSTOLIC BLOOD PRESSURE: 104 MMHG | TEMPERATURE: 96.3 F

## 2024-07-30 DIAGNOSIS — D75.1 SECONDARY POLYCYTHEMIA: Primary | ICD-10-CM

## 2024-07-30 LAB
BASOPHILS # BLD: 0 K/UL (ref 0–0.2)
BASOPHILS NFR BLD: 0.9 % (ref 0–1)
EOSINOPHIL # BLD: 0.2 K/UL (ref 0–0.6)
EOSINOPHIL NFR BLD: 4.2 % (ref 0–5)
ERYTHROCYTE [DISTWIDTH] IN BLOOD BY AUTOMATED COUNT: 13.2 % (ref 11.5–14.5)
HCT VFR BLD AUTO: 64 % (ref 42–52)
HGB BLD-MCNC: 20.8 G/DL (ref 14–18)
IMM GRANULOCYTES # BLD: 0 K/UL
LYMPHOCYTES # BLD: 0.9 K/UL (ref 1.1–4.5)
LYMPHOCYTES NFR BLD: 19.8 % (ref 20–40)
MCH RBC QN AUTO: 30.5 PG (ref 27–31)
MCHC RBC AUTO-ENTMCNC: 32.5 G/DL (ref 33–37)
MCV RBC AUTO: 93.7 FL (ref 80–94)
MONOCYTES # BLD: 0.5 K/UL (ref 0–0.9)
MONOCYTES NFR BLD: 11 % (ref 0–10)
NEUTROPHILS # BLD: 2.9 K/UL (ref 1.5–7.5)
NEUTS SEG NFR BLD: 63.9 % (ref 50–65)
PLATELET # BLD AUTO: 238 K/UL (ref 130–400)
PMV BLD AUTO: 10.1 FL (ref 9.4–12.4)
RBC # BLD AUTO: 6.83 M/UL (ref 4.7–6.1)
WBC # BLD AUTO: 4.5 K/UL (ref 4.8–10.8)

## 2024-07-30 PROCEDURE — 99195 PHLEBOTOMY: CPT

## 2024-07-30 PROCEDURE — 85025 COMPLETE CBC W/AUTO DIFF WBC: CPT

## 2024-07-30 RX ORDER — 0.9 % SODIUM CHLORIDE 0.9 %
250 INTRAVENOUS SOLUTION INTRAVENOUS ONCE
OUTPATIENT
Start: 2024-07-30 | End: 2024-07-30

## 2024-07-30 NOTE — TELEPHONE ENCOUNTER
This nurse spoke with pt's sister Marlene and scheduled weekly appt for stat CBC and possible Therapeutic Phlebotomy for  Hct > 60 as ordered per ELAYNE Kerr.

## 2024-07-30 NOTE — PROGRESS NOTES
Verbal consent obtained for Therapeutic Phlebotomy.  45 ml whole blood removed in first attempt before line clotted.  482 ml whole blood removed with 2nd stick (see flow sheet)pt tolerated well.  CBC obtained via phlebotomy port.  TIM HOLLY notified to enquire about appointments.  ELAYNE Kerr states Jose Martin DAMON RN will call pt after CBC results to schedule next phlebotomy and follow up. All information was relayed to pt and family, both stated understanding to all instructions.

## 2024-07-31 LAB
CALR EXON 9 MUT ANL BLD/T: NORMAL
CITATION REF LAB TEST: NORMAL
JAK2 GENE MUT ANL BLD/T: NORMAL
JAK2 P.V617F BLD/T QL: NORMAL
LAB DIRECTOR NAME PROVIDER: NORMAL
MPL GENE MUT TESTED MAR: NORMAL
REF LAB TEST METHOD: NORMAL
REFLEX: NORMAL
TEST PERFORMANCE INFO SPEC: NORMAL

## 2024-08-06 ENCOUNTER — HOSPITAL ENCOUNTER (OUTPATIENT)
Dept: INFUSION THERAPY | Age: 49
Setting detail: INFUSION SERIES
Discharge: HOME | End: 2024-08-06
Payer: MEDICAID

## 2024-08-06 VITALS
RESPIRATION RATE: 18 BRPM | TEMPERATURE: 97 F | DIASTOLIC BLOOD PRESSURE: 91 MMHG | SYSTOLIC BLOOD PRESSURE: 133 MMHG | HEART RATE: 70 BPM | OXYGEN SATURATION: 96 %

## 2024-08-06 DIAGNOSIS — D75.1 SECONDARY POLYCYTHEMIA: Primary | ICD-10-CM

## 2024-08-06 LAB
BASOPHILS # BLD: 0 K/UL (ref 0–0.2)
BASOPHILS NFR BLD: 0.7 % (ref 0–1)
EOSINOPHIL # BLD: 0.3 K/UL (ref 0–0.6)
EOSINOPHIL NFR BLD: 5.4 % (ref 0–5)
ERYTHROCYTE [DISTWIDTH] IN BLOOD BY AUTOMATED COUNT: 12.4 % (ref 11.5–14.5)
HCT VFR BLD AUTO: 58.7 % (ref 42–52)
HGB BLD-MCNC: 18.8 G/DL (ref 14–18)
IMM GRANULOCYTES # BLD: 0 K/UL
LYMPHOCYTES # BLD: 1 K/UL (ref 1.1–4.5)
LYMPHOCYTES NFR BLD: 18.1 % (ref 20–40)
MCH RBC QN AUTO: 29.7 PG (ref 27–31)
MCHC RBC AUTO-ENTMCNC: 32 G/DL (ref 33–37)
MCV RBC AUTO: 92.7 FL (ref 80–94)
MONOCYTES # BLD: 0.6 K/UL (ref 0–0.9)
MONOCYTES NFR BLD: 10.5 % (ref 0–10)
NEUTROPHILS # BLD: 3.6 K/UL (ref 1.5–7.5)
NEUTS SEG NFR BLD: 65.1 % (ref 50–65)
PLATELET # BLD AUTO: 198 K/UL (ref 130–400)
PMV BLD AUTO: 9.9 FL (ref 9.4–12.4)
RBC # BLD AUTO: 6.33 M/UL (ref 4.7–6.1)
WBC # BLD AUTO: 5.5 K/UL (ref 4.8–10.8)

## 2024-08-06 PROCEDURE — 85025 COMPLETE CBC W/AUTO DIFF WBC: CPT

## 2024-08-06 PROCEDURE — 99195 PHLEBOTOMY: CPT

## 2024-08-06 RX ORDER — 0.9 % SODIUM CHLORIDE 0.9 %
250 INTRAVENOUS SOLUTION INTRAVENOUS ONCE
OUTPATIENT
Start: 2024-08-13 | End: 2024-08-13

## 2024-08-06 NOTE — PROGRESS NOTES
Pt arrived to [facility] for therapeutic phlebotomy. 170ml whole blood removed in first attempt before line clotted. 350ml whole blood removed with second attempt. Pt tolerated well.    Electronically signed by Dorothea Ordoñez RN on 8/6/2024 at 8:11 AM

## 2024-08-13 ENCOUNTER — HOSPITAL ENCOUNTER (OUTPATIENT)
Dept: INFUSION THERAPY | Age: 49
Setting detail: INFUSION SERIES
Discharge: HOME | End: 2024-08-13
Payer: MEDICAID

## 2024-08-13 VITALS
TEMPERATURE: 97.4 F | HEART RATE: 91 BPM | DIASTOLIC BLOOD PRESSURE: 93 MMHG | SYSTOLIC BLOOD PRESSURE: 123 MMHG | OXYGEN SATURATION: 99 % | RESPIRATION RATE: 18 BRPM

## 2024-08-13 DIAGNOSIS — D75.1 SECONDARY POLYCYTHEMIA: Primary | ICD-10-CM

## 2024-08-13 LAB
BASOPHILS # BLD: 0 K/UL (ref 0–0.2)
BASOPHILS NFR BLD: 0.6 % (ref 0–1)
EOSINOPHIL # BLD: 0.2 K/UL (ref 0–0.6)
EOSINOPHIL NFR BLD: 3.6 % (ref 0–5)
ERYTHROCYTE [DISTWIDTH] IN BLOOD BY AUTOMATED COUNT: 12.1 % (ref 11.5–14.5)
HCT VFR BLD AUTO: 53.4 % (ref 42–52)
HGB BLD-MCNC: 18.2 G/DL (ref 14–18)
IMM GRANULOCYTES # BLD: 0 K/UL
LYMPHOCYTES # BLD: 1 K/UL (ref 1.1–4.5)
LYMPHOCYTES NFR BLD: 14.9 % (ref 20–40)
MCH RBC QN AUTO: 30.6 PG (ref 27–31)
MCHC RBC AUTO-ENTMCNC: 34.1 G/DL (ref 33–37)
MCV RBC AUTO: 89.9 FL (ref 80–94)
MONOCYTES # BLD: 0.7 K/UL (ref 0–0.9)
MONOCYTES NFR BLD: 9.8 % (ref 0–10)
NEUTROPHILS # BLD: 4.8 K/UL (ref 1.5–7.5)
NEUTS SEG NFR BLD: 71 % (ref 50–65)
PLATELET # BLD AUTO: 169 K/UL (ref 130–400)
PMV BLD AUTO: 9.7 FL (ref 9.4–12.4)
RBC # BLD AUTO: 5.94 M/UL (ref 4.7–6.1)
WBC # BLD AUTO: 6.8 K/UL (ref 4.8–10.8)

## 2024-08-13 PROCEDURE — 99195 PHLEBOTOMY: CPT

## 2024-08-13 PROCEDURE — 85025 COMPLETE CBC W/AUTO DIFF WBC: CPT

## 2024-08-13 RX ORDER — 0.9 % SODIUM CHLORIDE 0.9 %
250 INTRAVENOUS SOLUTION INTRAVENOUS ONCE
OUTPATIENT
Start: 2024-08-20 | End: 2024-08-20

## 2024-08-13 NOTE — DISCHARGE INSTRUCTIONS
Blood Draw for Donation or Treatment: Care Instructions  Overview     You have just had some blood removed. This procedure is called phlebotomy (say \"cdlc-LDM-omi-eliezer\").  People have their blood taken (drawn) for several reasons. You may have just donated blood so that it can be used to help someone else. Or you may have had blood removed to treat a medical condition, such as hemochromatosis or polycythemia. These take more blood than the sample that is needed for simple lab tests. For donation, about a pint of blood is drawn. If it's drawn for treatment, then more or less than a pint may be taken.  The puncture wound caused by the poke from the needle for giving blood usually heals without trouble. Most people feel fine after they give blood. But there are some simple things you can do to take care of yourself before you go home.  Right after you give blood, you may be asked to sit for a while and have some water or juice and a snack.  When you leave, get up slowly to make sure that you're not lightheaded. You may want to have a family member or friend take you home.  Follow-up care is a key part of your treatment and safety. Be sure to make and go to all appointments, and call your doctor if you are having problems. It's also a good idea to know your test results and keep a list of the medicines you take.  How can you care for yourself at home?  In the hours after you give blood, make sure to:  Drink plenty of fluids to help replace the lost fluid. If you have kidney, heart, or liver disease and have to limit fluids, talk with your doctor before you increase the amount of fluids you drink.  Limit your physical activity for several hours.  If you feel a little lightheaded, lie down for a while, and have some snacks. Call the blood bank or clinic if you feel sick within 24 hours after you give blood.  Eat foods rich in iron, such as meat, fish, beans, or leafy green vegetables, for several weeks to help your

## 2024-08-13 NOTE — PROGRESS NOTES
Therapeutic phlebotomy completed as ordered by TIM HOLLY.  508 ml whole blood removed (see flowsheet)Pt tolerated well.

## 2024-08-13 NOTE — PROGRESS NOTES
STAT CBC drawn as ordered. Hct 53.4. Per Aliyah Dorantes okay to do therapeutic phlebotomy today. Also stated no need for further appts until pt see her on 9/10.

## 2024-08-20 ENCOUNTER — OFFICE VISIT (OUTPATIENT)
Dept: GASTROENTEROLOGY | Age: 49
End: 2024-08-20

## 2024-08-20 VITALS
WEIGHT: 85 LBS | OXYGEN SATURATION: 98 % | HEART RATE: 68 BPM | BODY MASS INDEX: 13.66 KG/M2 | HEIGHT: 66 IN | DIASTOLIC BLOOD PRESSURE: 80 MMHG | SYSTOLIC BLOOD PRESSURE: 138 MMHG

## 2024-08-20 DIAGNOSIS — F10.10 ALCOHOL ABUSE, DAILY USE: ICD-10-CM

## 2024-08-20 DIAGNOSIS — K21.9 GASTROESOPHAGEAL REFLUX DISEASE, UNSPECIFIED WHETHER ESOPHAGITIS PRESENT: ICD-10-CM

## 2024-08-20 DIAGNOSIS — Z12.11 COLON CANCER SCREENING: Primary | ICD-10-CM

## 2024-08-20 RX ORDER — LISINOPRIL 10 MG/1
10 TABLET ORAL 2 TIMES DAILY
COMMUNITY
Start: 2024-08-19
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
## 2024-08-20 NOTE — PATIENT INSTRUCTIONS
scope down your esophagus into your stomach. The doctor also may look at the duodenum.     If your doctor wants to take a sample of tissue for a biopsy, he or she may use small surgical tools, which are put into the scope, to cut off some tissue. You will not feel a biopsy, if one is taken. The doctor also can use the tools to stop bleeding or to do other treatments, if needed.     You will stay at the hospital or surgery center for 1 to 2 hours until the medicine you were given wears off.   What happens after an upper GI endoscopy?   After the test, you may belch and feel bloated for a while.     You may have a tickling, dry throat or mouth. You may feel a bit hoarse, and you may have a mild sore throat. These symptoms may last several days. Throat lozenges and warm saltwater gargles can help relieve the throat symptoms.     Ask your doctor when you can drive again.     Your doctor will tell you when you can go back to your usual diet and activities.     Don't drink alcohol for 12 to 24 hours after the test.   When should you call your doctor?   You have questions or concerns.     You don't understand how to prepare for your procedure.     You become ill before the procedure (such as fever, flu, or a cold).     You need to reschedule or have changed your mind about having the procedure.   Where can you learn more?  Go to https://www.[employer].net/patientEd and enter P790 to learn more about \"Upper GI Endoscopy: Before Your Procedure.\"  Current as of: June 6, 2022               Content Version: 13.5  © 2006-2022 [employer].   Care instructions adapted under license by [employer]. If you have questions about a medical condition or this instruction, always ask your healthcare professional. [employer] disclaims any warranty or liability for your use of this information.        Colonoscopy: Before Your Procedure  What is a colonoscopy?     A colonoscopy is a test that lets a doctor look

## 2024-09-05 ENCOUNTER — TELEPHONE (OUTPATIENT)
Dept: HEMATOLOGY | Age: 49
End: 2024-09-05

## 2024-09-05 NOTE — TELEPHONE ENCOUNTER
Called pt. to remind them of appointment on 09/10/2024. Phone went to voicemail and stated it was Shadia's number. Pt does not have a Shadia on call list. That is the only number in chart to call.

## 2024-09-10 ENCOUNTER — HOSPITAL ENCOUNTER (OUTPATIENT)
Dept: INFUSION THERAPY | Age: 49
Discharge: HOME | End: 2024-09-10
Payer: MEDICAID

## 2024-09-10 ENCOUNTER — OFFICE VISIT (OUTPATIENT)
Dept: HEMATOLOGY | Age: 49
End: 2024-09-10
Payer: MEDICAID

## 2024-09-10 VITALS
DIASTOLIC BLOOD PRESSURE: 66 MMHG | WEIGHT: 85.4 LBS | BODY MASS INDEX: 15.13 KG/M2 | HEART RATE: 94 BPM | TEMPERATURE: 97.7 F | HEIGHT: 63 IN | SYSTOLIC BLOOD PRESSURE: 124 MMHG | OXYGEN SATURATION: 96 %

## 2024-09-10 DIAGNOSIS — K70.30 ALCOHOLIC CIRRHOSIS OF LIVER WITHOUT ASCITES (HCC): Primary | ICD-10-CM

## 2024-09-10 DIAGNOSIS — F10.10 ALCOHOL ABUSE: ICD-10-CM

## 2024-09-10 DIAGNOSIS — K70.30 ALCOHOLIC CIRRHOSIS OF LIVER WITHOUT ASCITES (HCC): ICD-10-CM

## 2024-09-10 DIAGNOSIS — D72.821 MONOCYTOSIS: ICD-10-CM

## 2024-09-10 DIAGNOSIS — D75.1 SECONDARY POLYCYTHEMIA: ICD-10-CM

## 2024-09-10 DIAGNOSIS — D75.1 ERYTHROCYTOSIS: Primary | ICD-10-CM

## 2024-09-10 LAB
ALBUMIN SERPL-MCNC: 4.6 G/DL (ref 3.5–5.2)
ALP SERPL-CCNC: 76 U/L (ref 40–129)
ALT SERPL-CCNC: 16 U/L (ref 5–41)
ANION GAP SERPL CALCULATED.3IONS-SCNC: 8 MMOL/L (ref 7–19)
AST SERPL-CCNC: 30 U/L (ref 5–40)
BASOPHILS # BLD: 0.04 K/UL (ref 0.01–0.08)
BASOPHILS NFR BLD: 0.6 % (ref 0.1–1.2)
BILIRUB SERPL-MCNC: 0.7 MG/DL (ref 0–1.2)
BUN SERPL-MCNC: 14 MG/DL (ref 6–20)
CALCIUM SERPL-MCNC: 9.8 MG/DL (ref 8.6–10)
CHLORIDE SERPL-SCNC: 95 MMOL/L (ref 98–107)
CO2 SERPL-SCNC: 32 MMOL/L (ref 22–29)
CREAT SERPL-MCNC: 0.7 MG/DL (ref 0.7–1.2)
EOSINOPHIL # BLD: 0.19 K/UL (ref 0.04–0.54)
EOSINOPHIL NFR BLD: 2.9 % (ref 0.7–7)
ERYTHROCYTE [DISTWIDTH] IN BLOOD BY AUTOMATED COUNT: 12.7 % (ref 11.6–14.4)
GLUCOSE SERPL-MCNC: 96 MG/DL (ref 70–99)
HCT VFR BLD AUTO: 52 % (ref 40.1–51)
HGB BLD-MCNC: 17.1 G/DL (ref 13.7–17.5)
LYMPHOCYTES # BLD: 0.43 K/UL (ref 1.18–3.74)
LYMPHOCYTES NFR BLD: 6.6 % (ref 19.3–53.1)
MCH RBC QN AUTO: 28.7 PG (ref 25.7–32.2)
MCHC RBC AUTO-ENTMCNC: 32.9 G/DL (ref 32.3–36.5)
MCV RBC AUTO: 87.2 FL (ref 79–92.2)
MONOCYTES # BLD: 0.56 K/UL (ref 0.24–0.82)
MONOCYTES NFR BLD: 8.6 % (ref 4.7–12.5)
NEUTROPHILS # BLD: 5.27 K/UL (ref 1.56–6.13)
NEUTS SEG NFR BLD: 81.3 % (ref 34–71.1)
PLATELET # BLD AUTO: 195 K/UL (ref 163–337)
PMV BLD AUTO: 10.5 FL (ref 7.4–10.4)
POTASSIUM SERPL-SCNC: 4.6 MMOL/L (ref 3.5–5.1)
PROT SERPL-MCNC: 7.7 G/DL (ref 6.4–8.3)
RBC # BLD AUTO: 5.96 M/UL (ref 4.63–6.08)
SODIUM SERPL-SCNC: 135 MMOL/L (ref 136–145)
WBC # BLD AUTO: 6.49 K/UL (ref 4.23–9.07)

## 2024-09-10 PROCEDURE — 85025 COMPLETE CBC W/AUTO DIFF WBC: CPT

## 2024-09-10 PROCEDURE — 80053 COMPREHEN METABOLIC PANEL: CPT

## 2024-09-10 PROCEDURE — 99214 OFFICE O/P EST MOD 30 MIN: CPT | Performed by: NURSE PRACTITIONER

## 2024-09-10 PROCEDURE — 99212 OFFICE O/P EST SF 10 MIN: CPT

## 2024-09-10 PROCEDURE — 36415 COLL VENOUS BLD VENIPUNCTURE: CPT

## 2024-09-10 RX ORDER — DM/PE/ACETAMINOPHEN/CHLORPHENR 10-5-325-2
100 TABLET, SEQUENTIAL ORAL DAILY
COMMUNITY
Start: 2024-09-03

## 2024-09-10 RX ORDER — MINOCYCLINE HYDROCHLORIDE 100 MG/1
100 CAPSULE ORAL 2 TIMES DAILY
COMMUNITY
Start: 2024-09-03

## 2024-09-10 RX ORDER — ONDANSETRON 8 MG/1
8 TABLET, FILM COATED ORAL EVERY 6 HOURS PRN
COMMUNITY
Start: 2024-09-03

## 2024-09-10 RX ORDER — MUPIROCIN 20 MG/G
1 OINTMENT TOPICAL 2 TIMES DAILY
COMMUNITY
Start: 2024-09-03

## 2024-09-10 RX ORDER — ASPIRIN 81 MG/1
81 TABLET ORAL DAILY
COMMUNITY

## 2024-09-12 LAB — NUCLEAR IGG SER QL IA: NORMAL

## 2024-10-24 ENCOUNTER — TELEPHONE (OUTPATIENT)
Dept: GASTROENTEROLOGY | Age: 49
End: 2024-10-24

## 2024-10-24 NOTE — TELEPHONE ENCOUNTER
Called patient to remind them of their procedure with Dr. Tc Uribe  at [facility]  on 10/29/24  to arrive at 830     NO ANS / LM  and aware that their  will need to remain during procedure

## 2024-12-06 ENCOUNTER — TELEPHONE (OUTPATIENT)
Dept: HEMATOLOGY | Age: 49
End: 2024-12-06

## 2024-12-06 NOTE — TELEPHONE ENCOUNTER
Called Patient and reminded patient of their appointment on 12/10/2024 and patient confirmed they would be here. Reminded patient to just come at appointment time, and to not come at the lab appointment time. Reminded patient that we will not check them in any more than 30 minutes before appointment time.  We have now moved to the [facility] [place] that is located between our old office and the ER at the [facility]. Letting the Pt know that our front entrance faces the  [employer] [place]. Reminded pt to eat well and be well hydrated for their labs.

## 2024-12-09 DIAGNOSIS — K70.30 ALCOHOLIC CIRRHOSIS OF LIVER WITHOUT ASCITES (HCC): Primary | ICD-10-CM

## 2024-12-09 ASSESSMENT — ENCOUNTER SYMPTOMS
DIARRHEA: 0
ABDOMINAL PAIN: 0
EYE DISCHARGE: 0
EYE PAIN: 0
VOMITING: 0
CONSTIPATION: 0
EYES NEGATIVE: 1
SHORTNESS OF BREATH: 0
GASTROINTESTINAL NEGATIVE: 1
NAUSEA: 0
EYE REDNESS: 0
BACK PAIN: 1
RESPIRATORY NEGATIVE: 1
WHEEZING: 0
COUGH: 0
SORE THROAT: 0
BLOOD IN STOOL: 0

## 2024-12-09 NOTE — PROGRESS NOTES
mg daily  -No need for therapeutic phlebotomy, need to maintain a hemoglobin <19 and hematocrit <60    2. Monocytosis, chronic : Currently resolved with absolute monocyte count of 0.56    07/22/2024 Serology results  Peripheral blood smear- increased erythrocytes with no significant morphologic abnormalities.  Lymphocytes are slightly decreased in number without any morphologic abnormalities.  Neutrophils and monocytes with usual morphology; no circulating blasts noted.  Platelets are adequate in number with rare giant forms.    BCR/ABL-Negative FISH study for BCR/ABL1 rearrangement    09/10/2024- ALBIN none detected    3.  Long-term alcohol use: He reported currently drinking 1 pint of vodka daily.  AFP of 1.8 on 07/22/2024 08/02/2024 US abdomen- reported normal sonographic appearance of the liver. No gallstones or biliary tree dilatation. Findings suggesting medical renal disease.  No hydronephrosis.    Seen by ELAYNE Lewis with [facility] on 08/25/2024    -Colonoscopy and endoscopy scheduled for 01/28/2025  -Strongly encouraged to consider cessation of alcohol due to poor effects on health to include liver disease which could lead to hepatocellular carcinoma.    4. Folate and vitamin B12 deficiency: Resolved  07/22/2024 Serology results  B12/Folate 564/>20 on 07/22/2024    5.  Vitamin D deficiency: Reported currently taking vitamin D 50,000 units p.o. weekly: Resolved    Vit D of 57.2 on 07/22/2024    -Discussed need to continue or discontinue use of vitamin D replacement with Dr. Jovon Phoenix    I discussed all of the above findings included in the assessment and plan with the patient and the patient is in agreement to move forward with current recommendations/treatment.  I have addressed all of their questions and concerns that were verbalized.    FOLLOW UP:  Follow-up appointment 3 months, sooner if needed  Continue to follow with other medical providers as recommended  Labs at next

## 2024-12-10 ENCOUNTER — HOSPITAL ENCOUNTER (OUTPATIENT)
Dept: INFUSION THERAPY | Age: 49
Discharge: HOME | End: 2024-12-10
Payer: MEDICAID

## 2024-12-10 ENCOUNTER — OFFICE VISIT (OUTPATIENT)
Dept: HEMATOLOGY | Age: 49
End: 2024-12-10
Payer: MEDICAID

## 2024-12-10 VITALS
TEMPERATURE: 98.4 F | HEART RATE: 76 BPM | DIASTOLIC BLOOD PRESSURE: 92 MMHG | HEIGHT: 63 IN | SYSTOLIC BLOOD PRESSURE: 138 MMHG | BODY MASS INDEX: 15.13 KG/M2 | WEIGHT: 85.4 LBS | OXYGEN SATURATION: 98 %

## 2024-12-10 DIAGNOSIS — F10.10 ALCOHOL ABUSE: ICD-10-CM

## 2024-12-10 DIAGNOSIS — K70.30 ALCOHOLIC CIRRHOSIS OF LIVER WITHOUT ASCITES (HCC): ICD-10-CM

## 2024-12-10 DIAGNOSIS — D75.1 ERYTHROCYTOSIS: Primary | ICD-10-CM

## 2024-12-10 DIAGNOSIS — D75.1 SECONDARY POLYCYTHEMIA: ICD-10-CM

## 2024-12-10 DIAGNOSIS — D75.1 ERYTHROCYTOSIS: ICD-10-CM

## 2024-12-10 DIAGNOSIS — D72.821 MONOCYTOSIS: ICD-10-CM

## 2024-12-10 LAB
ALBUMIN SERPL-MCNC: 4.6 G/DL (ref 3.5–5.2)
ALP SERPL-CCNC: 64 U/L (ref 40–129)
ALT SERPL-CCNC: 12 U/L (ref 5–41)
ANION GAP SERPL CALCULATED.3IONS-SCNC: 7 MMOL/L (ref 7–19)
AST SERPL-CCNC: 20 U/L (ref 5–40)
BASOPHILS # BLD: 0.03 K/UL (ref 0.01–0.08)
BASOPHILS NFR BLD: 0.4 % (ref 0.1–1.2)
BILIRUB SERPL-MCNC: 0.5 MG/DL (ref 0–1.2)
BUN SERPL-MCNC: 8 MG/DL (ref 6–20)
CALCIUM SERPL-MCNC: 10.1 MG/DL (ref 8.6–10)
CHLORIDE SERPL-SCNC: 98 MMOL/L (ref 98–107)
CO2 SERPL-SCNC: 31 MMOL/L (ref 22–29)
CREAT SERPL-MCNC: 0.6 MG/DL (ref 0.7–1.2)
EOSINOPHIL # BLD: 0.22 K/UL (ref 0.04–0.54)
EOSINOPHIL NFR BLD: 2.9 % (ref 0.7–7)
ERYTHROCYTE [DISTWIDTH] IN BLOOD BY AUTOMATED COUNT: 15.9 % (ref 11.6–14.4)
FERRITIN SERPL-MCNC: 105.5 NG/ML (ref 30–400)
GLUCOSE SERPL-MCNC: 99 MG/DL (ref 70–99)
HCT VFR BLD AUTO: 51.2 % (ref 40.1–51)
HGB BLD-MCNC: 17.2 G/DL (ref 13.7–17.5)
IRON SATN MFR SERPL: 66 % (ref 14–50)
IRON SERPL-MCNC: 232 UG/DL (ref 59–158)
LYMPHOCYTES # BLD: 0.9 K/UL (ref 1.18–3.74)
LYMPHOCYTES NFR BLD: 12.1 % (ref 19.3–53.1)
MCH RBC QN AUTO: 30.8 PG (ref 25.7–32.2)
MCHC RBC AUTO-ENTMCNC: 33.6 G/DL (ref 32.3–36.5)
MCV RBC AUTO: 91.8 FL (ref 79–92.2)
MONOCYTES # BLD: 0.95 K/UL (ref 0.24–0.82)
MONOCYTES NFR BLD: 12.7 % (ref 4.7–12.5)
NEUTROPHILS # BLD: 5.34 K/UL (ref 1.56–6.13)
NEUTS SEG NFR BLD: 71.6 % (ref 34–71.1)
PLATELET # BLD AUTO: 240 K/UL (ref 163–337)
PMV BLD AUTO: 9.9 FL (ref 7.4–10.4)
POTASSIUM SERPL-SCNC: 4.8 MMOL/L (ref 3.5–5.1)
PROT SERPL-MCNC: 7.5 G/DL (ref 6.4–8.3)
RBC # BLD AUTO: 5.58 M/UL (ref 4.63–6.08)
SODIUM SERPL-SCNC: 136 MMOL/L (ref 136–145)
TIBC SERPL-MCNC: 349 UG/DL (ref 250–400)
WBC # BLD AUTO: 7.46 K/UL (ref 4.23–9.07)

## 2024-12-10 PROCEDURE — 99212 OFFICE O/P EST SF 10 MIN: CPT

## 2024-12-10 PROCEDURE — 85025 COMPLETE CBC W/AUTO DIFF WBC: CPT

## 2024-12-10 PROCEDURE — 80053 COMPREHEN METABOLIC PANEL: CPT

## 2024-12-10 PROCEDURE — 99213 OFFICE O/P EST LOW 20 MIN: CPT | Performed by: NURSE PRACTITIONER

## 2024-12-10 PROCEDURE — 36415 COLL VENOUS BLD VENIPUNCTURE: CPT

## 2024-12-10 RX ORDER — LANOLIN ALCOHOL/MO/W.PET/CERES
1000 CREAM (GRAM) TOPICAL DAILY
COMMUNITY
Start: 2024-12-09

## 2024-12-10 RX ORDER — ASCORBIC ACID 500 MG
500 TABLET ORAL DAILY
COMMUNITY
Start: 2024-12-09

## 2024-12-10 RX ORDER — FERROUS SULFATE 325(65) MG
325 TABLET ORAL
COMMUNITY
Start: 2024-12-09

## 2024-12-10 RX ORDER — FOLIC ACID 1 MG/1
1 TABLET ORAL DAILY
COMMUNITY
Start: 2024-12-09

## 2024-12-17 ASSESSMENT — ENCOUNTER SYMPTOMS
CONSTIPATION: 0
ABDOMINAL PAIN: 0
VOMITING: 0
GASTROINTESTINAL NEGATIVE: 1
BACK PAIN: 1
EYE REDNESS: 0
EYE DISCHARGE: 0
EYE PAIN: 0
DIARRHEA: 0
WHEEZING: 0
EYES NEGATIVE: 1
NAUSEA: 0
COUGH: 0
RESPIRATORY NEGATIVE: 1
BLOOD IN STOOL: 0
SORE THROAT: 0
SHORTNESS OF BREATH: 0

## 2025-01-28 ENCOUNTER — ANCILLARY PROCEDURE (OUTPATIENT)
Dept: ENDOSCOPY | Age: 50
End: 2025-01-28
Attending: INTERNAL MEDICINE
Payer: MEDICAID

## 2025-01-28 ENCOUNTER — HOSPITAL ENCOUNTER (OUTPATIENT)
Age: 50
Setting detail: OUTPATIENT SURGERY
Discharge: HOME | End: 2025-01-28
Attending: INTERNAL MEDICINE | Admitting: INTERNAL MEDICINE
Payer: MEDICAID

## 2025-01-28 ENCOUNTER — ANESTHESIA EVENT (OUTPATIENT)
Dept: ENDOSCOPY | Age: 50
End: 2025-01-28
Payer: MEDICAID

## 2025-01-28 ENCOUNTER — ANESTHESIA (OUTPATIENT)
Dept: ENDOSCOPY | Age: 50
End: 2025-01-28
Payer: MEDICAID

## 2025-01-28 VITALS
HEIGHT: 63 IN | OXYGEN SATURATION: 100 % | SYSTOLIC BLOOD PRESSURE: 131 MMHG | HEART RATE: 64 BPM | TEMPERATURE: 96.9 F | BODY MASS INDEX: 15.06 KG/M2 | WEIGHT: 85 LBS | RESPIRATION RATE: 18 BRPM | DIASTOLIC BLOOD PRESSURE: 77 MMHG

## 2025-01-28 DIAGNOSIS — K21.9 CHRONIC GERD: ICD-10-CM

## 2025-01-28 DIAGNOSIS — Z12.11 SCREEN FOR COLON CANCER: ICD-10-CM

## 2025-01-28 PROCEDURE — 88342 IMHCHEM/IMCYTCHM 1ST ANTB: CPT

## 2025-01-28 PROCEDURE — 3700000000 HC ANESTHESIA ATTENDED CARE: Performed by: INTERNAL MEDICINE

## 2025-01-28 PROCEDURE — 6370000000 HC RX 637 (ALT 250 FOR IP): Performed by: NURSE ANESTHETIST, CERTIFIED REGISTERED

## 2025-01-28 PROCEDURE — 2580000003 HC RX 258: Performed by: NURSE ANESTHETIST, CERTIFIED REGISTERED

## 2025-01-28 PROCEDURE — 3700000001 HC ADD 15 MINUTES (ANESTHESIA): Performed by: INTERNAL MEDICINE

## 2025-01-28 PROCEDURE — 6360000002 HC RX W HCPCS: Performed by: NURSE ANESTHETIST, CERTIFIED REGISTERED

## 2025-01-28 PROCEDURE — 7100000011 HC PHASE II RECOVERY - ADDTL 15 MIN: Performed by: INTERNAL MEDICINE

## 2025-01-28 PROCEDURE — 3609012400 HC EGD TRANSORAL BIOPSY SINGLE/MULTIPLE: Performed by: INTERNAL MEDICINE

## 2025-01-28 PROCEDURE — 3609010600 HC COLONOSCOPY POLYPECTOMY SNARE/COLD BIOPSY: Performed by: INTERNAL MEDICINE

## 2025-01-28 PROCEDURE — 7100000010 HC PHASE II RECOVERY - FIRST 15 MIN: Performed by: INTERNAL MEDICINE

## 2025-01-28 PROCEDURE — 88305 TISSUE EXAM BY PATHOLOGIST: CPT

## 2025-01-28 PROCEDURE — 2580000003 HC RX 258: Performed by: INTERNAL MEDICINE

## 2025-01-28 PROCEDURE — 2709999900 HC NON-CHARGEABLE SUPPLY: Performed by: INTERNAL MEDICINE

## 2025-01-28 RX ORDER — PROPOFOL 10 MG/ML
INJECTION, EMULSION INTRAVENOUS
Status: DISCONTINUED | OUTPATIENT
Start: 2025-01-28 | End: 2025-01-28

## 2025-01-28 RX ORDER — PROPOFOL 10 MG/ML
INJECTION, EMULSION INTRAVENOUS
Status: DISCONTINUED | OUTPATIENT
Start: 2025-01-28 | End: 2025-01-28 | Stop reason: SDUPTHER

## 2025-01-28 RX ORDER — SODIUM CHLORIDE, SODIUM LACTATE, POTASSIUM CHLORIDE, CALCIUM CHLORIDE 600; 310; 30; 20 MG/100ML; MG/100ML; MG/100ML; MG/100ML
INJECTION, SOLUTION INTRAVENOUS CONTINUOUS
Status: DISCONTINUED | OUTPATIENT
Start: 2025-01-28 | End: 2025-01-28 | Stop reason: HOSPADM

## 2025-01-28 RX ORDER — LIDOCAINE HYDROCHLORIDE 10 MG/ML
INJECTION, SOLUTION INFILTRATION; PERINEURAL
Status: DISCONTINUED | OUTPATIENT
Start: 2025-01-28 | End: 2025-01-28 | Stop reason: SDUPTHER

## 2025-01-28 RX ORDER — MIDAZOLAM HYDROCHLORIDE 1 MG/ML
INJECTION, SOLUTION INTRAMUSCULAR; INTRAVENOUS
Status: DISCONTINUED | OUTPATIENT
Start: 2025-01-28 | End: 2025-01-28 | Stop reason: SDUPTHER

## 2025-01-28 RX ORDER — SODIUM CHLORIDE, SODIUM LACTATE, POTASSIUM CHLORIDE, CALCIUM CHLORIDE 600; 310; 30; 20 MG/100ML; MG/100ML; MG/100ML; MG/100ML
INJECTION, SOLUTION INTRAVENOUS
Status: DISCONTINUED | OUTPATIENT
Start: 2025-01-28 | End: 2025-01-28 | Stop reason: SDUPTHER

## 2025-01-28 RX ORDER — HYDROCODONE BITARTRATE AND ACETAMINOPHEN 7.5; 325 MG/1; MG/1
1 TABLET ORAL ONCE
Status: COMPLETED | OUTPATIENT
Start: 2025-01-28 | End: 2025-01-28

## 2025-01-28 RX ADMIN — PROPOFOL 200 MG: 10 INJECTION, EMULSION INTRAVENOUS at 12:01

## 2025-01-28 RX ADMIN — HYDROCODONE BITARTRATE AND ACETAMINOPHEN 1 TABLET: 7.5; 325 TABLET ORAL at 08:27

## 2025-01-28 RX ADMIN — SODIUM CHLORIDE, POTASSIUM CHLORIDE, SODIUM LACTATE AND CALCIUM CHLORIDE: 600; 310; 30; 20 INJECTION, SOLUTION INTRAVENOUS at 07:25

## 2025-01-28 RX ADMIN — PHENYLEPHRINE HYDROCHLORIDE 100 MCG: 10 INJECTION INTRAVENOUS at 12:25

## 2025-01-28 RX ADMIN — SODIUM CHLORIDE, SODIUM LACTATE, POTASSIUM CHLORIDE, AND CALCIUM CHLORIDE: 600; 310; 30; 20 INJECTION, SOLUTION INTRAVENOUS at 11:57

## 2025-01-28 RX ADMIN — MIDAZOLAM 2 MG: 1 INJECTION INTRAMUSCULAR; INTRAVENOUS at 11:54

## 2025-01-28 RX ADMIN — SODIUM CHLORIDE, POTASSIUM CHLORIDE, SODIUM LACTATE AND CALCIUM CHLORIDE: 600; 310; 30; 20 INJECTION, SOLUTION INTRAVENOUS at 11:07

## 2025-01-28 RX ADMIN — LIDOCAINE HYDROCHLORIDE 50 MG: 10 INJECTION, SOLUTION INFILTRATION; PERINEURAL at 12:01

## 2025-01-28 ASSESSMENT — PAIN - FUNCTIONAL ASSESSMENT
PAIN_FUNCTIONAL_ASSESSMENT: 0-10

## 2025-01-28 ASSESSMENT — PAIN DESCRIPTION - ORIENTATION: ORIENTATION: RIGHT;LEFT;UPPER

## 2025-01-28 ASSESSMENT — PAIN DESCRIPTION - LOCATION: LOCATION: LEG

## 2025-01-28 ASSESSMENT — PAIN SCALES - GENERAL: PAINLEVEL_OUTOF10: 7

## 2025-01-28 NOTE — ANESTHESIA POSTPROCEDURE EVALUATION
Department of Anesthesiology  Postprocedure Note    Patient: Bladimir Gonzalez  MRN: [id number]  YOB: 1975  Date of evaluation: 1/28/2025    Procedure Summary       Date: 01/28/25 Room / Location: [place] [address] / [facility]    Anesthesia Start: 1157 Anesthesia Stop: 1227    Procedures:       ESOPHAGOGASTRODUODENOSCOPY BIOPSY      COLONOSCOPY POLYPECTOMY SNARE/BIOPSY Diagnosis:       Chronic GERD      Screen for colon cancer      (Chronic GERD [K21.9])      (Screen for colon cancer [Z12.11])    Surgeons: Tc Uribe MD Responsible Provider: Umu Branch APRN - CRNA    Anesthesia Type: TIVA ASA Status: 3            Anesthesia Type: No value filed.    Serjio Phase I: Serjio Score: 10    Serjio Phase II:      Anesthesia Post Evaluation    Patient location during evaluation: bedside  Patient participation: complete - patient participated  Level of consciousness: sleepy but conscious  Pain score: 0  Airway patency: patent  Nausea & Vomiting: no nausea and no vomiting  Cardiovascular status: hemodynamically stable  Respiratory status: acceptable  Hydration status: stable  Pain management: adequate    No notable events documented.

## 2025-01-28 NOTE — DISCHARGE INSTRUCTIONS
or worse belly pain.     You have blood in your stools.     You are sick to your stomach and cannot keep fluids down.     You have a fever.     You cannot pass stools or gas.   Watch closely for changes in your health, and be sure to contact your doctor if:    Your throat still hurts after a day or two.     You do not get better as expected.   Where can you learn more?  Go to https://www.[employer].net/patientEd and enter J454 to learn more about \"Upper GI Endoscopy: What to Expect at Home.\"  Current as of: October 19, 2023  Content Version: 14.3  © 2024 [employer].   Care instructions adapted under license by [employer] If you have questions about a medical condition or this instruction, always ask your healthcare professional. [employer], disclaims any warranty or liability for your use of this information.       Colonoscopy: What to Expect at Home  Your Recovery  After a colonoscopy, you'll stay at the clinic until you wake up. Then you can go home. But you'll need to arrange for a ride. Your doctor will tell you when you can eat and do your other usual activities.  Your doctor will talk to you about when you'll need your next colonoscopy. Your doctor can help you decide how often you need to be checked. This will depend on the results of your test and your risk for colorectal cancer.  After the test, you may be bloated or have gas pains. You may need to pass gas. If a biopsy was done or a polyp was removed, you may have streaks of blood in your stool (feces) for a few days. Problems such as heavy rectal bleeding may not occur until several weeks after the test. This isn't common. But it can happen after polyps are removed.  This care sheet gives you a general idea about how long it will take for you to recover. But each person recovers at a different pace. Follow the steps below to get better as quickly as possible.  How can you care for yourself at home?  Activity    Rest when you

## 2025-01-28 NOTE — H&P
Patient Name: Bladimir Gonzalez  : 1975  MRN: [id number]  DATE: 25    Allergies:   Allergies   Allergen Reactions    Codeine      Pt denies    Mushroom Extract Complex (Do Not Select) Hives        ENDOSCOPY  History and Physical    Procedure:    [] Diagnostic Colonoscopy       [x] Screening Colonoscopy  [x] EGD      [] ERCP      [] EUS       [] Other    [x] Previous office notes/History and Physical reviewed from the patients chart. Please see EMR for further details of HPI. I have examined the patient's status immediately prior to the procedure and:      Indications/HPI:    []Abdominal Pain   []Barretts  [x]Screening/Surveillance   []History of Polyps  []Dysphagia            [] +Cologard/DNA testing  []Abnormal Imaging              []EOE Hx              [] Family Hx of CRC/Polyps  []Anemia                            []Food Impaction       []Recent Poor Prep  []GI Bleed             []Lymphadenopathy  []History of Polyps  []Change in bowel habits [x]Heartburn/Reflux  []Cancer- GI/Lung  []Chest Pain - Non Cardiac []Heme (+) Stool []Ulcers  []Constipation  []Hemoptysis  []Incontinence    []Diarrhea  []Hypoxemia  []Rectal Bleed (BRBPR)  []Nausea/Vomiting   [] Varices  []Crohns/Colitis  []Pancreatic Cyst   [] Cirrhosis   []Pancreatitis    []Abnormal MRCP  []Elevated LFT [] Stent Removal, Previous ERCP  []Other:     Anesthesia:   [x] MAC [] Moderate Sedation   [] General   [] None     ROS: 12 pt Review of Symptoms was negative unless mentioned above    Medications:   Prior to Admission medications    Medication Sig Start Date End Date Taking? Authorizing Provider   vitamin C (ASCORBIC ACID) 500 MG tablet Take 1 tablet by mouth daily 24  Yes Mary Velazquez MD   vitamin B-12 (CYANOCOBALAMIN) 1000 MCG tablet Take 1 tablet by mouth daily 24  Yes ProviderMary MD   folic acid (FOLVITE) 1 MG tablet Take 1 tablet by mouth daily 24  Yes Provider, Historical, MD   FEROSUL 325 (65 Fe)

## 2025-01-28 NOTE — ANESTHESIA PRE PROCEDURE
Department of Anesthesiology  Preprocedure Note       Name:  Bladimir Gonzalez   Age:  49 y.o.  :  1975                                          MRN:  [id number]         Date:  2025      Surgeon: Surgeon(s):  Tc Uribe MD    Procedure: Procedure(s):  ESOPHAGOGASTRODUODENOSCOPY BIOPSY  COLORECTAL CANCER SCREENING, NOT HIGH RISK    Medications prior to admission:   Prior to Admission medications    Medication Sig Start Date End Date Taking? Authorizing Provider   vitamin C (ASCORBIC ACID) 500 MG tablet Take 1 tablet by mouth daily 24  Yes Mary Velazquez MD   vitamin B-12 (CYANOCOBALAMIN) 1000 MCG tablet Take 1 tablet by mouth daily 24  Yes Mary Velazquez MD   folic acid (FOLVITE) 1 MG tablet Take 1 tablet by mouth daily 24  Yes Mary Velazquez MD   FEROSUL 325 (65 Fe) MG tablet Take 1 tablet by mouth daily (with breakfast) 24  Yes Mary Velazquez MD   ondansetron (ZOFRAN) 8 MG tablet Take 1 tablet by mouth every 6 hours as needed 9/3/24  Yes Provider, Historical, MD   GNP VITAMIN B-1 100 MG tablet Take 1 tablet by mouth daily 9/3/24  Yes Mary Velazquez MD   lisinopril (PRINIVIL;ZESTRIL) 10 MG tablet Take 1 tablet by mouth in the morning and at bedtime 24  Yes Mary Velazquez MD   HYDROcodone-acetaminophen (NORCO) 7.5-325 MG per tablet Take 1 tablet by mouth every 8 hours as needed. 24  Yes Mary Velazquez MD   pantoprazole (PROTONIX) 40 MG tablet Take 1 tablet by mouth daily 24  Yes Mary Velazquez MD   SERTRALINE HCL PO Take 100 mg by mouth daily   Yes Mary Velazquez MD   gabapentin (NEURONTIN) 300 MG capsule Take 1 capsule by mouth 3 times daily. 10/12/18  Yes Mary Velazquez MD   aspirin 81 MG EC tablet Take 1 tablet by mouth daily    Mary Velazquez MD   vitamin D (ERGOCALCIFEROL) 1.25 MG (27345 UT) CAPS capsule Take 1 capsule by mouth every 14 days 24   Mary Velazquez MD       Current

## 2025-01-28 NOTE — OP NOTE
Endoscopic Procedure Note    Patient: Bladimir Gonzalez : 1975  Med Rec#: [id number] Acc#: [id number]     Primary Care Provider Jovon Phoenix DO  Referring Provider:  HANK HOLLY    Endoscopist: Tc Uribe MD    Date of Procedure:  2025    Procedure:   1. EGD with biopsy    Indications:   1. Reflux   2. Chronic liver disease     Anesthesia:  Sedation was administered by anesthesia who monitored the patient during the procedure.    Estimated Blood Loss: minimal    Procedure:   After reviewing the patient's chart and obtaining informed consent, the patient was placed in the left lateral decubitus position.  A forward-viewing Olympus endoscope was lubricated and inserted through the mouth into the oropharynx. Under direct visualization, the upper esophagus was intubated. The scope was advanced to the level of the third portion of duodenum. Scope was slowly withdrawn with careful inspection of the mucosal surfaces. The scope was retroflexed for inspection of the gastric fundus and incisura. Findings and maneuvers are listed in impression below. The patient tolerated the procedure well. The scope was removed. There were no immediate complications.    Findings:   Esophagus: abnormal: mild esophagitis noted. No focal stricture seen.   There is no significant hiatal hernia present.      Stomach:  abnormal: mild mucosal changes suggestive of gastritis noted -  Gastric biopsies were taken from the antrum and body to rule out Helicobacter pylori infection. No evidence of significant portal hypertension.       Duodenum: normal      IMPRESSION:  1. Esophagitis   2. Gastritis      RECOMMENDATIONS:    1.  Await path results  2.  PPI daily   3.  NSAID avoidance  4.  Follow up in the office in 3 months.     The results were discussed with the patient and family.  A copy of the images obtained were given to the patient.     Tc Uribe MD  2025  12:22 PM

## 2025-01-28 NOTE — OP NOTE
Patient: Bladimir Gonzalez : 1975  Med Rec#: [id number] Acc#: [id number]   Primary Care Provider Jovon Phoenix DO    Date of Procedure:  2025    Endoscopist: Tc Uribe MD    Referring Provider: Jovon Phoenix DO,     Operation Performed: Colonoscopy with snare polypectomy    Indications: screening    Anesthesia:  Sedation was administered by anesthesia who monitored the patient during the procedure.    I met with Bladimir Gonzalez prior to procedure. We discussed the procedure itself, and I have discussed the risks of endoscopy (including-- but not limited to-- pain, discomfort, bleeding potentially requiring second endoscopic procedure and/or blood transfusion, organ perforation requiring operative repair, damage to organs near the colon, infection, aspiration, cardiopulmonary/allergic reaction), benefits, indications to endoscopy. Additionally, we discussed options other than colonoscopy. The patient expressed understanding. All questions answered. The patient decided to proceed with the procedure.  Signed informed consent was placed on the chart.    Blood Loss: minimal    Withdrawal time: n/a  Bowel Prep: adequate     Complications: no immediate complications    DESCRIPTION OF PROCEDURE:     A time out was performed. After written informed consent was obtained, the patient was placed in the left lateral position.     The perianal area was inspected, and a digital rectal exam was performed. A rectal exam was performed: normal tone, no palpable lesions. At this point, a forward viewing Olympus colonoscope was inserted into the anus and carefully advanced to the cecum.  The cecum was identified by the ileocecal valve and the appendiceal orifice. The colonoscope was then slowly withdrawn with careful inspection of the mucosa in a linear and circumferential fashion. The scope was retroflexed in the rectum. Suction was utilized during the procedure to remove as much air as possible from the bowel. The

## 2025-05-06 ENCOUNTER — RESULTS FOLLOW-UP (OUTPATIENT)
Dept: GASTROENTEROLOGY | Age: 50
End: 2025-05-06

## 2025-05-06 ENCOUNTER — OFFICE VISIT (OUTPATIENT)
Dept: GASTROENTEROLOGY | Age: 50
End: 2025-05-06
Payer: MEDICAID

## 2025-05-06 VITALS
OXYGEN SATURATION: 97 % | HEART RATE: 76 BPM | HEIGHT: 63 IN | SYSTOLIC BLOOD PRESSURE: 120 MMHG | WEIGHT: 85 LBS | BODY MASS INDEX: 15.06 KG/M2 | DIASTOLIC BLOOD PRESSURE: 70 MMHG

## 2025-05-06 DIAGNOSIS — F10.10 ALCOHOL ABUSE, DAILY USE: ICD-10-CM

## 2025-05-06 DIAGNOSIS — K76.9 LIVER DISEASE: ICD-10-CM

## 2025-05-06 DIAGNOSIS — F10.10 ALCOHOL ABUSE, DAILY USE: Primary | ICD-10-CM

## 2025-05-06 DIAGNOSIS — K21.9 GASTROESOPHAGEAL REFLUX DISEASE, UNSPECIFIED WHETHER ESOPHAGITIS PRESENT: ICD-10-CM

## 2025-05-06 LAB
AFP SERPL-MCNC: 1.9 NG/ML (ref 0–8.3)
ALBUMIN SERPL-MCNC: 4.4 G/DL (ref 3.5–5.2)
ALP SERPL-CCNC: 72 U/L (ref 40–129)
ALT SERPL-CCNC: 10 U/L (ref 10–50)
ANION GAP SERPL CALCULATED.3IONS-SCNC: 9 MMOL/L (ref 8–16)
AST SERPL-CCNC: 20 U/L (ref 10–50)
BILIRUB SERPL-MCNC: 0.2 MG/DL (ref 0.2–1.2)
BUN SERPL-MCNC: 18 MG/DL (ref 6–20)
CALCIUM SERPL-MCNC: 9.4 MG/DL (ref 8.6–10)
CHLORIDE SERPL-SCNC: 98 MMOL/L (ref 98–107)
CO2 SERPL-SCNC: 31 MMOL/L (ref 22–29)
CREAT SERPL-MCNC: 0.8 MG/DL (ref 0.7–1.2)
GLUCOSE SERPL-MCNC: 99 MG/DL (ref 70–99)
INR PPP: 1.16 (ref 0.88–1.18)
POTASSIUM SERPL-SCNC: 4.9 MMOL/L (ref 3.5–5.1)
PROT SERPL-MCNC: 7 G/DL (ref 6.4–8.3)
PROTHROMBIN TIME: 14.7 SEC (ref 12–14.6)
SODIUM SERPL-SCNC: 138 MMOL/L (ref 136–145)

## 2025-05-06 PROCEDURE — 99213 OFFICE O/P EST LOW 20 MIN: CPT | Performed by: NURSE PRACTITIONER

## 2025-05-06 RX ORDER — LANOLIN ALCOHOL/MO/W.PET/CERES
400 CREAM (GRAM) TOPICAL DAILY
COMMUNITY
Start: 2025-04-07

## 2025-05-06 RX ORDER — TIZANIDINE 2 MG/1
2 TABLET ORAL 2 TIMES DAILY
COMMUNITY
Start: 2025-04-17

## 2025-05-06 NOTE — PROGRESS NOTES
Subjective:     Patient ID: Bladimir Gonzalez is a 49 y.o. male  PCP: Jovon Phoenix DO  Referring Provider: No ref. provider found    HPI  History of Present Illness  Patient seen in the office today for follow up after Colonoscopy and EGD   EGD, Colonoscopy, and pathology reports reviewed and discussed with the patient and all questions answered   Reports are in Epic  Denies any postprocedure complications      He reports no complications following his recent colonoscopy and EGD, which revealed the presence of polyps. One polyp was removed and found to be benign. His bowel movements are regular and unremarkable. An endoscopy showed irritation in the esophagus and stomach, while the top of the small bowel appeared normal.    He experiences nausea postprandially, for which he takes Zofran as needed, finding it beneficial. He is on a daily regimen of Protonix, administered in the morning.    He is also seen to monitor his liver cirrhosis  He continues to consume alcohol, albeit at a reduced rate of half a pint of liquor per day.    MELD 8    SOCIAL HISTORY  Occupation: Worked in the main hospital.  Alcohol: Drinks half a pint of alcohol a day.    Results  Diagnostic Testing   - Colonoscopy: One benign polyp removed, otherwise normal   - Endoscopy: Some irritation in the esophagus and stomach, top of small bowel looked normal   - Liver ultrasound: 08/2024, Normal      Assessment:     Assessment & Plan  1. Post-colonoscopy follow-up:  - One benign polyp removed; remainder of colonoscopy normal  - Repeat colonoscopy recommended in 5 years    2. Esophagitis and gastritis:  - Endoscopy showed esophageal and gastric irritation  - Protonix taken every morning  - Zofran taken as needed for nausea, which occurs when eating and is alleviated by Zofran    3. Cirrhosis:  - Normal liver ultrasound in 08/2024, previously diagnosed with cirrhosis  - Consumes half a pint of alcohol daily, significantly reduced from previous intake  -

## 2025-05-08 ASSESSMENT — ENCOUNTER SYMPTOMS
VOMITING: 0
CHOKING: 0
ABDOMINAL PAIN: 0
CONSTIPATION: 0
RECTAL PAIN: 0
SHORTNESS OF BREATH: 0
DIARRHEA: 0
ABDOMINAL DISTENTION: 0
ANAL BLEEDING: 0
NAUSEA: 0
BLOOD IN STOOL: 0
COUGH: 0
TROUBLE SWALLOWING: 0

## 2025-05-31 DIAGNOSIS — K70.30 ALCOHOLIC CIRRHOSIS OF LIVER WITHOUT ASCITES (HCC): Primary | ICD-10-CM

## 2025-06-05 ENCOUNTER — TELEPHONE (OUTPATIENT)
Dept: HEMATOLOGY | Age: 50
End: 2025-06-05

## 2025-06-05 NOTE — TELEPHONE ENCOUNTER
Called patient today for their upcoming appointment on 06/10/2025 and patient needed to be rescheduled. Patient's name and number was taken and given to  staff ( sent to  via teams    )  so that the patient could be rescheduled to a better date & time for the patient.

## 2025-09-03 ENCOUNTER — TELEPHONE (OUTPATIENT)
Dept: HEMATOLOGY | Age: 50
End: 2025-09-03

## (undated) DEVICE — FORCEPS BX L240CM JAW DIA2.8MM L CAP W/ NDL MIC MESH TOOTH

## (undated) DEVICE — ENDO KIT,LOURDES HOSPITAL: Brand: MEDLINE INDUSTRIES, INC.

## (undated) DEVICE — SNARE ENDOSCP L240CM OD24MM LOOP W10MM RND INSUL STIFF BRAID